# Patient Record
Sex: MALE | Race: WHITE | NOT HISPANIC OR LATINO | Employment: OTHER | URBAN - METROPOLITAN AREA
[De-identification: names, ages, dates, MRNs, and addresses within clinical notes are randomized per-mention and may not be internally consistent; named-entity substitution may affect disease eponyms.]

---

## 2019-04-19 ENCOUNTER — TELEPHONE (OUTPATIENT)
Dept: FAMILY MEDICINE | Facility: CLINIC | Age: 59
End: 2019-04-19

## 2019-08-13 PROBLEM — D64.9 ANEMIA: Status: ACTIVE | Noted: 2017-11-13

## 2019-08-13 PROBLEM — C18.9 MALIGNANT NEOPLASM OF LARGE INTESTINE (CMS/HCC): Status: ACTIVE | Noted: 2017-11-13

## 2019-08-14 ENCOUNTER — OFFICE VISIT (OUTPATIENT)
Dept: FAMILY MEDICINE | Facility: CLINIC | Age: 59
End: 2019-08-14
Payer: COMMERCIAL

## 2019-08-14 VITALS
DIASTOLIC BLOOD PRESSURE: 84 MMHG | TEMPERATURE: 98.2 F | SYSTOLIC BLOOD PRESSURE: 144 MMHG | BODY MASS INDEX: 26.01 KG/M2 | HEIGHT: 68 IN | WEIGHT: 171.6 LBS | HEART RATE: 72 BPM | OXYGEN SATURATION: 98 %

## 2019-08-14 DIAGNOSIS — C18.9 MALIGNANT NEOPLASM OF COLON, UNSPECIFIED PART OF COLON (CMS/HCC): ICD-10-CM

## 2019-08-14 DIAGNOSIS — Z00.00 ENCOUNTER FOR GENERAL ADULT MEDICAL EXAMINATION WITHOUT ABNORMAL FINDINGS: Primary | ICD-10-CM

## 2019-08-14 DIAGNOSIS — Z12.5 SCREENING FOR PROSTATE CANCER: ICD-10-CM

## 2019-08-14 PROCEDURE — 90715 TDAP VACCINE 7 YRS/> IM: CPT | Performed by: FAMILY MEDICINE

## 2019-08-14 PROCEDURE — 90471 IMMUNIZATION ADMIN: CPT | Performed by: FAMILY MEDICINE

## 2019-08-14 PROCEDURE — 99396 PREV VISIT EST AGE 40-64: CPT | Mod: 25 | Performed by: FAMILY MEDICINE

## 2019-08-14 PROCEDURE — 36415 COLL VENOUS BLD VENIPUNCTURE: CPT | Performed by: FAMILY MEDICINE

## 2019-08-14 ASSESSMENT — ENCOUNTER SYMPTOMS
ADENOPATHY: 0
NAUSEA: 0
VOMITING: 0
SORE THROAT: 0
WEAKNESS: 0
DYSURIA: 0
DIZZINESS: 0
COUGH: 0
CHILLS: 0
ARTHRALGIAS: 0
BACK PAIN: 0
ABDOMINAL PAIN: 0
CONSTIPATION: 0
NUMBNESS: 0
BLOOD IN STOOL: 0
RHINORRHEA: 0
FREQUENCY: 0
PALPITATIONS: 0
SHORTNESS OF BREATH: 0
DIARRHEA: 0
FEVER: 0

## 2019-08-14 NOTE — PROGRESS NOTES
[address] [address]  [phone]       Reason for visit:   Chief Complaint   Patient presents with   • Annual Exam      HPI   Dane Beckwith is a 59 y.o. male who presents with CPX   Updates Yes Checked BP at home this AM - 120/80    Diet - Healthy  Exercise - Daily - Cardio, light weights    Smoke No   Alcohol Yes   Drugs No     Lives with Wife  Work Retired CPA  Colonoscopy Due Yes   CT Lung Due No     Hep A Due Yes   TDAP Due Yes   Flu Due No   Shingles Due Yes   Lipids Due Yes   Hep C Due No    Past Medical History:   Diagnosis Date   • Colon cancer (CMS/HCC) (HCC)      Past Surgical History:   Procedure Laterality Date   • COLECTOMY       Social History     Social History   • Marital status:      Spouse name: N/A   • Number of children: N/A   • Years of education: N/A     Occupational History   • Not on file.     Social History Main Topics   • Smoking status: Never Smoker   • Smokeless tobacco: Never Used   • Alcohol use Yes      Comment: 7-8/week   • Drug use: No   • Sexual activity: Yes     Partners: Female     Other Topics Concern   • Not on file     Social History Narrative    Do you wear your seatbelt? Yes    Do you have smoke detector in your home? Yes    Do you have a carbon monoxide detector in your home? 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          Family History   Problem Relation Age of Onset   • Breast cancer Mother    • Colon cancer Father    • Heart disease Father    • Heart disease Father's Brother      Amoxicillin trihydrate; Penicillins; and Potassium clavulanate  No current outpatient prescriptions on file.     No current facility-administered medications for this visit.        Review of Systems   Constitutional: Negative for chills and fever.   HENT: Negative for congestion, rhinorrhea and sore throat.    Respiratory: Negative for cough and shortness of breath.   "  Cardiovascular: Negative for chest pain and palpitations.   Gastrointestinal: Negative for abdominal pain, blood in stool, constipation, diarrhea, nausea and vomiting.   Genitourinary: Negative for dysuria, frequency and testicular pain.   Musculoskeletal: Negative for arthralgias and back pain.   Skin: Negative for rash.   Allergic/Immunologic: Negative for environmental allergies and food allergies.   Neurological: Negative for dizziness, weakness and numbness.   Hematological: Negative for adenopathy.     Objective   Vitals:    08/14/19 0958 08/14/19 1023   BP: (!) 162/84 (!) 144/84   BP Location: Right upper arm Right upper arm   Patient Position: Sitting Sitting   Pulse: 72    Temp: 36.8 °C (98.2 °F)    TempSrc: Oral    SpO2: 98%    Weight: 77.8 kg (171 lb 9.6 oz)    Height: 1.727 m (5' 8\")        Physical Exam   Constitutional: He is oriented to person, place, and time. He appears well-developed and well-nourished.   HENT:   Head: Normocephalic and atraumatic.   Right Ear: Tympanic membrane and ear canal normal.   Left Ear: Tympanic membrane and ear canal normal.   Nose: Nose normal.   Mouth/Throat: Oropharynx is clear and moist and mucous membranes are normal.   Eyes: Pupils are equal, round, and reactive to light. Conjunctivae are normal.   Neck: No thyroid mass and no thyromegaly present.   Cardiovascular: Normal rate, regular rhythm, S1 normal, S2 normal and normal pulses.  Exam reveals no gallop and no friction rub.    No murmur heard.  Pulses:       Radial pulses are 2+ on the right side, and 2+ on the left side.   Pulmonary/Chest: Effort normal and breath sounds normal. He has no wheezes. He has no rhonchi. He has no rales.   Abdominal: Soft. Normal appearance and bowel sounds are normal. There is no tenderness. There is no rebound and no guarding.   Musculoskeletal: Normal range of motion.   Lymphadenopathy:     He has no cervical adenopathy.   Neurological: He is alert and oriented to person, place, " and time. He has normal strength. No cranial nerve deficit.   Psychiatric: He has a normal mood and affect. His speech is normal and behavior is normal. Judgment normal. Cognition and memory are normal.   Nursing note and vitals reviewed.      Procedures    Health Maintenance   Topic Date Due   • Pneumococcal (1 of 3 - PCV13) 01/04/1964   • HIV Screening  01/04/1971   • DTaP, Tdap, and Td Vaccines (1 - Tdap) 01/04/1977   • Zoster Vaccine (1 of 2) 01/04/2008   • Colonoscopy  01/04/2008   • Influenza Vaccine (1) 08/01/2019   • Meningococcal ACWY  Aged Out   • Varicella Vaccines  Aged Out   • HIB Vaccines  Aged Out   • IPV Vaccines  Aged Out   • HPV Vaccines  Aged Out   • Hepatitis C Screening  Completed       Lab Results   Component Value Date    WBC 4.0 11/14/2017    HGB 15.2 11/14/2017    HCT 45.6 11/14/2017     11/14/2017    CHOL 172 11/14/2017    TRIG 80 11/14/2017    HDL 57 11/14/2017    ALT 12 11/14/2017    AST 14 11/14/2017     11/14/2017    K 4.3 11/14/2017     11/14/2017    CREATININE 0.94 11/14/2017    BUN 16 11/14/2017    CO2 23 11/14/2017    PSA 0.4 11/14/2017         Assessment   Problem List Items Addressed This Visit     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

## 2019-08-14 NOTE — PATIENT INSTRUCTIONS
Diet - Try to limit portion sizes, take out, fast food, processed foods. Alcohol can be a expensive source of calories! If these are current problems for you, pick one area and focus on that first!    Exercise - Goal should be 30-40 minutes, 3-4 times a week. If you are currently not exercising, set reachable goals with short term deadlines!    Preventative Care Due - Colonoscopy     Labs Due Today - Yes    Shots Due Today - TDap    Follow up - 1 year or sooner if anything comes up    Make sure you are seeing your specialists, Eye Doctor, Foot Doctor, OBGYN yearly!    Check BP at home 1-2x/week

## 2019-08-15 LAB
ALBUMIN SERPL-MCNC: 4.3 G/DL (ref 3.6–4.8)
ALBUMIN/GLOB SERPL: 2 {RATIO} (ref 1.2–2.2)
ALP SERPL-CCNC: 69 IU/L (ref 39–117)
ALT SERPL-CCNC: 15 IU/L (ref 0–44)
AST SERPL-CCNC: 17 IU/L (ref 0–40)
BASOPHILS # BLD AUTO: 0 X10E3/UL (ref 0–0.2)
BASOPHILS NFR BLD AUTO: 1 %
BILIRUB SERPL-MCNC: 0.5 MG/DL (ref 0–1.2)
BUN SERPL-MCNC: 18 MG/DL (ref 8–27)
BUN/CREAT SERPL: 19 (ref 10–24)
CALCIUM SERPL-MCNC: 9.7 MG/DL (ref 8.6–10.2)
CHLORIDE SERPL-SCNC: 103 MMOL/L (ref 96–106)
CHOLEST SERPL-MCNC: 200 MG/DL (ref 100–199)
CO2 SERPL-SCNC: 24 MMOL/L (ref 20–29)
CREAT SERPL-MCNC: 0.97 MG/DL (ref 0.76–1.27)
EOSINOPHIL # BLD AUTO: 0.1 X10E3/UL (ref 0–0.4)
EOSINOPHIL NFR BLD AUTO: 2 %
ERYTHROCYTE [DISTWIDTH] IN BLOOD BY AUTOMATED COUNT: 14.4 % (ref 12.3–15.4)
GLOBULIN SER CALC-MCNC: 2.1 G/DL (ref 1.5–4.5)
GLUCOSE SERPL-MCNC: 92 MG/DL (ref 65–99)
HCT VFR BLD AUTO: 45.3 % (ref 37.5–51)
HDLC SERPL-MCNC: 59 MG/DL
HGB BLD-MCNC: 15.3 G/DL (ref 13–17.7)
IMM GRANULOCYTES # BLD AUTO: 0 X10E3/UL (ref 0–0.1)
IMM GRANULOCYTES NFR BLD AUTO: 0 %
LAB CORP EGFR IF AFRICN AM: 97 ML/MIN/1.73
LAB CORP EGFR IF NONAFRICN AM: 84 ML/MIN/1.73
LDLC SERPL CALC-MCNC: 121 MG/DL (ref 0–99)
LYMPHOCYTES # BLD AUTO: 1.2 X10E3/UL (ref 0.7–3.1)
LYMPHOCYTES NFR BLD AUTO: 27 %
MCH RBC QN AUTO: 28.1 PG (ref 26.6–33)
MCHC RBC AUTO-ENTMCNC: 33.8 G/DL (ref 31.5–35.7)
MCV RBC AUTO: 83 FL (ref 79–97)
MONOCYTES # BLD AUTO: 0.5 X10E3/UL (ref 0.1–0.9)
MONOCYTES NFR BLD AUTO: 12 %
NEUTROPHILS # BLD AUTO: 2.6 X10E3/UL (ref 1.4–7)
NEUTROPHILS NFR BLD AUTO: 58 %
PLATELET # BLD AUTO: 207 X10E3/UL (ref 150–450)
POTASSIUM SERPL-SCNC: 4.6 MMOL/L (ref 3.5–5.2)
PROT SERPL-MCNC: 6.4 G/DL (ref 6–8.5)
PSA SERPL-MCNC: 0.5 NG/ML (ref 0–4)
RBC # BLD AUTO: 5.44 X10E6/UL (ref 4.14–5.8)
SODIUM SERPL-SCNC: 141 MMOL/L (ref 134–144)
TRIGL SERPL-MCNC: 99 MG/DL (ref 0–149)
VLDLC SERPL CALC-MCNC: 20 MG/DL (ref 5–40)
WBC # BLD AUTO: 4.4 X10E3/UL (ref 3.4–10.8)

## 2020-10-01 ENCOUNTER — OFFICE VISIT (OUTPATIENT)
Dept: FAMILY MEDICINE | Facility: CLINIC | Age: 60
End: 2020-10-01
Payer: COMMERCIAL

## 2020-10-01 VITALS
HEIGHT: 68 IN | BODY MASS INDEX: 25.55 KG/M2 | OXYGEN SATURATION: 98 % | WEIGHT: 168.6 LBS | SYSTOLIC BLOOD PRESSURE: 118 MMHG | HEART RATE: 73 BPM | DIASTOLIC BLOOD PRESSURE: 82 MMHG | TEMPERATURE: 97.3 F

## 2020-10-01 DIAGNOSIS — C18.9 MALIGNANT NEOPLASM OF COLON, UNSPECIFIED PART OF COLON (CMS/HCC): ICD-10-CM

## 2020-10-01 DIAGNOSIS — Z12.5 SCREENING FOR PROSTATE CANCER: ICD-10-CM

## 2020-10-01 DIAGNOSIS — Z00.00 ENCOUNTER FOR GENERAL ADULT MEDICAL EXAMINATION WITHOUT ABNORMAL FINDINGS: Primary | ICD-10-CM

## 2020-10-01 PROCEDURE — 36415 COLL VENOUS BLD VENIPUNCTURE: CPT | Performed by: FAMILY MEDICINE

## 2020-10-01 PROCEDURE — 99396 PREV VISIT EST AGE 40-64: CPT | Mod: 25 | Performed by: FAMILY MEDICINE

## 2020-10-01 ASSESSMENT — ENCOUNTER SYMPTOMS
SORE THROAT: 0
BACK PAIN: 0
RHINORRHEA: 0
COUGH: 0
ABDOMINAL PAIN: 0
ADENOPATHY: 0
NAUSEA: 0
PALPITATIONS: 0
ARTHRALGIAS: 0
FREQUENCY: 0
DYSURIA: 0
CONSTIPATION: 0
FEVER: 0
DIFFICULTY URINATING: 0
DIZZINESS: 0
CHILLS: 0
WEAKNESS: 0
BLOOD IN STOOL: 0
NUMBNESS: 0
SHORTNESS OF BREATH: 0
VOMITING: 0
DIARRHEA: 0

## 2020-10-01 NOTE — PATIENT INSTRUCTIONS
Diet - Try to limit portion sizes, take out, fast food, processed foods. Alcohol can be a expensive source of calories! If these are current problems for you, pick one area and focus on that first! There is no such thing as a perfect diet. If you are trying to lose weight, it will be difficult to do with foods you do not enjoy.  You may need to try a few different things before finding something.  In general, the diet with the best evidence is the Mediterranean diet. If you have high blood pressure, the DASH diet has good evidence to lower weight and BP.    Exercise - Goal should be 30-40 minutes, 3-4 times a week. If you are currently not exercising, set reachable goals with short term deadlines! The same goes with diet - you are less likely to be successful if you are doing something you don't enjoy.  Weights - even 1-2 pounds! - are great to strengthen bones in old adults.      Preventative Care Due - Colonoscopy in December    Labs Due Today - Yes    Shots Due Today - None    Follow up - 1 year or sooner if anything comes up. We keep same-day sick appointments available every day for last minute problems.  If it is during the week - call us! Often times we can prevent an ER or UC visit! For certain problems, a telemedicine visit can be a great way to see me without having to come into the office or go to an UC!    If you have any specialists such as a Cardiologist, Gastroenterologist for a chronic problem, make sure you are following up with them as recommended!  Diabetics should be having a yearly eye exam by a Optometrist/Ophthalmologist and a foot exam by a Podiatrist!  Women should see their GYN yearly - though you may not need a pap smear done at each visit.  Those with a family history of skin cancer should see a Dermatologist annually.    [facility]

## 2020-10-01 NOTE — ASSESSMENT & PLAN NOTE
Adult Male  Complaints - None  Diet - Healthy  Activity - Active with walking  Tobacco Use - None  EtOH Use - 1/night  Drug Use - None  Sexual Activity -   PMH - Colon Cancer  FHX - M Breast; D colon CAD  Labs Due - CBC CMP LP PSA  Immunizations Due - None  Preventative Care Due - Colonoscopy in Winter  Refused - None  Follow up - 1 y

## 2020-10-01 NOTE — PROGRESS NOTES
[place] [address]  [place] [address]  [phone]       Reason for visit:   Chief Complaint   Patient presents with   • Annual Exam      HPI   Dane Beckwith is a 60 y.o. male who presents for his CPX   Updates No Checks BP at home - well controlled 120-130s/70s    Diet - Healthy  Exercise - Walking    Smoke No   Alcohol Yes   Drugs No     Lives with Wife  Work Retired - CPA  Colonoscopy Due No Due 12/20  CT Lung Due No     Hep A Due Yes   TDAP Due No   Flu Due No   Shingles Due No   Lipids Due Yes   Hep C Due No    Past Medical History:   Diagnosis Date   • Colon cancer (CMS/HCC)      Past Surgical History:   Procedure Laterality Date   • COLECTOMY       Social History     Socioeconomic History   • Marital status:      Spouse name: Not on file   • Number of children: Not on file   • Years of education: Not on file   • Highest education level: Not on file   Occupational History   • Not on file   Social Needs   • Financial resource strain: Not on file   • Food insecurity     Worry: Not on file     Inability: Not on file   • Transportation needs     Medical: Not on file     Non-medical: Not on file   Tobacco Use   • Smoking status: Never Smoker   • Smokeless tobacco: Never Used   Substance and Sexual Activity   • Alcohol use: Yes     Comment: 7-8/week   • Drug use: No   • Sexual activity: Yes     Partners: Female   Lifestyle   • Physical activity     Days per week: Not on file     Minutes per session: Not on file   • Stress: Not on file   Relationships   • Social connections     Talks on phone: Not on file     Gets together: Not on file     Attends Orthodoxy service: Not on file     Active member of club or organization: Not on file     Attends meetings of clubs or organizations: Not on file     Relationship status: Not on file   • Intimate partner violence     Fear of current or ex partner: Not on file     Emotionally abused: Not on file     Physically  "abused: Not on file     Forced sexual activity: Not on file   Other Topics Concern   • Not on file   Social History Narrative    Do you wear your seatbelt? Yes    Do you have smoke detector in your home? Yes    Do you have a carbon monoxide detector in your home? 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      Family History   Problem Relation Age of Onset   • Breast cancer Biological Mother    • Colon cancer Biological Father    • Heart disease Biological Father    • Heart disease Father's Brother      Amoxicillin trihydrate, Penicillins, and Potassium clavulanate  No current outpatient medications on file.     No current facility-administered medications for this visit.        Review of Systems   Constitutional: Negative for chills and fever.   HENT: Negative for congestion, hearing loss, rhinorrhea and sore throat.    Eyes: Negative for visual disturbance.   Respiratory: Negative for cough and shortness of breath.    Cardiovascular: Negative for chest pain and palpitations.   Gastrointestinal: Negative for abdominal pain, blood in stool, constipation, diarrhea, nausea and vomiting.   Genitourinary: Negative for decreased urine volume, difficulty urinating, dysuria, frequency, testicular pain and urgency.        Nocturia - 0 to 1/night   Musculoskeletal: Negative for arthralgias and back pain.   Skin: Negative for rash.   Allergic/Immunologic: Negative for environmental allergies and food allergies.   Neurological: Negative for dizziness, weakness and numbness.   Hematological: Negative for adenopathy.     Objective   Vitals:    10/01/20 0914 10/01/20 0933   BP: (!) 153/90 118/82   BP Location: Left upper arm Left upper arm   Patient Position: Sitting Sitting   Pulse: 73    Temp: 36.3 °C (97.3 °F)    TempSrc: Temporal    SpO2: 98%    Weight: 76.5 kg (168 lb 9.6 oz)    Height: 1.727 m (5' 8\")        Physical Exam  Vitals signs and nursing note reviewed.   Constitutional:       " Appearance: Normal appearance. He is well-developed.   HENT:      Head: Normocephalic and atraumatic.      Right Ear: Tympanic membrane and ear canal normal.      Left Ear: Tympanic membrane and ear canal normal.      Nose: Nose normal.   Eyes:      Conjunctiva/sclera: Conjunctivae normal.      Pupils: Pupils are equal, round, and reactive to light.   Neck:      Thyroid: No thyroid mass or thyromegaly.   Cardiovascular:      Rate and Rhythm: Normal rate and regular rhythm.      Pulses: Normal pulses.           Radial pulses are 2+ on the right side and 2+ on the left side.      Heart sounds: S1 normal and S2 normal. No murmur. No friction rub. No gallop.    Pulmonary:      Effort: Pulmonary effort is normal.      Breath sounds: Normal breath sounds. No wheezing, rhonchi or rales.   Abdominal:      General: Bowel sounds are normal.      Palpations: Abdomen is soft.      Tenderness: There is no abdominal tenderness. There is no guarding or rebound.   Musculoskeletal: Normal range of motion.   Lymphadenopathy:      Cervical: No cervical adenopathy.   Neurological:      Mental Status: He is alert and oriented to person, place, and time.      Cranial Nerves: No cranial nerve deficit.   Psychiatric:         Speech: Speech normal.         Behavior: Behavior normal.         Judgment: Judgment normal.         Procedures    Health Maintenance   Topic Date Due   • Varicella Vaccines (1 of 2 - 2-dose childhood series) 01/04/1959   • Pneumococcal (1 of 3 - PCV13) 01/04/1964   • HIV Screening  01/04/1971   • Zoster Vaccine (1 of 2) 01/04/2008   • Influenza Vaccine (1) 08/01/2020   • Colonoscopy  12/18/2020   • DTaP, Tdap, and Td Vaccines (2 - Td) 08/14/2029   • Hepatitis C Screening  Completed   • Meningococcal ACWY  Aged Out   • HIB Vaccines  Aged Out   • IPV Vaccines  Aged Out   • HPV Vaccines  Aged Out       Lab Results   Component Value Date    WBC 4.4 08/14/2019    HGB 15.3 08/14/2019    HCT 45.3 08/14/2019      08/14/2019    CHOL 200 (H) 08/14/2019    TRIG 99 08/14/2019    HDL 59 08/14/2019    ALT 15 08/14/2019    AST 17 08/14/2019     08/14/2019    K 4.6 08/14/2019     08/14/2019    CREATININE 0.97 08/14/2019    BUN 18 08/14/2019    CO2 24 08/14/2019    PSA 0.5 08/14/2019         Assessment   Problem List Items Addressed This Visit        Digestive    Malignant neoplasm of large intestine (CMS/HCC)       Other    Encounter for general adult medical examination without abnormal findings - Primary     Adult Male  Complaints - None  Diet - Healthy  Activity - Active with walking  Tobacco Use - None  EtOH Use - 1/night  Drug Use - None  Sexual Activity -   PMH - Colon Cancer  FHX - M Breast; D colon CAD  Labs Due - CBC CMP LP PSA  Immunizations Due - None  Preventative Care Due - Colonoscopy in Winter  Refused - None  Follow up - 1 y         Relevant Orders    CBC and Differential    Comprehensive metabolic panel    Lipid panel      Other Visit Diagnoses     Screening for prostate cancer        Relevant Orders    PSA              Alexander Duron MD  10/1/2020

## 2020-10-02 LAB
ALBUMIN SERPL-MCNC: 4.4 G/DL (ref 3.8–4.8)
ALBUMIN/GLOB SERPL: 1.8 {RATIO} (ref 1.2–2.2)
ALP SERPL-CCNC: 72 IU/L (ref 39–117)
ALT SERPL-CCNC: 15 IU/L (ref 0–44)
AST SERPL-CCNC: 18 IU/L (ref 0–40)
BASOPHILS # BLD AUTO: 0 X10E3/UL (ref 0–0.2)
BASOPHILS NFR BLD AUTO: 1 %
BILIRUB SERPL-MCNC: 0.5 MG/DL (ref 0–1.2)
BUN SERPL-MCNC: 23 MG/DL (ref 8–27)
BUN/CREAT SERPL: 26 (ref 10–24)
CALCIUM SERPL-MCNC: 9.5 MG/DL (ref 8.6–10.2)
CHLORIDE SERPL-SCNC: 104 MMOL/L (ref 96–106)
CHOLEST SERPL-MCNC: 203 MG/DL (ref 100–199)
CO2 SERPL-SCNC: 25 MMOL/L (ref 20–29)
CREAT SERPL-MCNC: 0.9 MG/DL (ref 0.76–1.27)
EOSINOPHIL # BLD AUTO: 0.1 X10E3/UL (ref 0–0.4)
EOSINOPHIL NFR BLD AUTO: 2 %
ERYTHROCYTE [DISTWIDTH] IN BLOOD BY AUTOMATED COUNT: 13.2 % (ref 11.6–15.4)
GLOBULIN SER CALC-MCNC: 2.4 G/DL (ref 1.5–4.5)
GLUCOSE SERPL-MCNC: 99 MG/DL (ref 65–99)
HCT VFR BLD AUTO: 47.8 % (ref 37.5–51)
HDLC SERPL-MCNC: 61 MG/DL
HGB BLD-MCNC: 15.9 G/DL (ref 13–17.7)
IMM GRANULOCYTES # BLD AUTO: 0 X10E3/UL (ref 0–0.1)
IMM GRANULOCYTES NFR BLD AUTO: 1 %
LAB CORP EGFR IF AFRICN AM: 105 ML/MIN/1.73
LAB CORP EGFR IF NONAFRICN AM: 91 ML/MIN/1.73
LDLC SERPL CALC-MCNC: 127 MG/DL (ref 0–99)
LYMPHOCYTES # BLD AUTO: 1.1 X10E3/UL (ref 0.7–3.1)
LYMPHOCYTES NFR BLD AUTO: 25 %
MCH RBC QN AUTO: 28.1 PG (ref 26.6–33)
MCHC RBC AUTO-ENTMCNC: 33.3 G/DL (ref 31.5–35.7)
MCV RBC AUTO: 85 FL (ref 79–97)
MONOCYTES # BLD AUTO: 0.5 X10E3/UL (ref 0.1–0.9)
MONOCYTES NFR BLD AUTO: 11 %
NEUTROPHILS # BLD AUTO: 2.7 X10E3/UL (ref 1.4–7)
NEUTROPHILS NFR BLD AUTO: 60 %
PLATELET # BLD AUTO: 228 X10E3/UL (ref 150–450)
POTASSIUM SERPL-SCNC: 5.4 MMOL/L (ref 3.5–5.2)
PROT SERPL-MCNC: 6.8 G/DL (ref 6–8.5)
PSA SERPL-MCNC: 0.4 NG/ML (ref 0–4)
RBC # BLD AUTO: 5.66 X10E6/UL (ref 4.14–5.8)
SODIUM SERPL-SCNC: 140 MMOL/L (ref 134–144)
TRIGL SERPL-MCNC: 82 MG/DL (ref 0–149)
VLDLC SERPL CALC-MCNC: 15 MG/DL (ref 5–40)
WBC # BLD AUTO: 4.4 X10E3/UL (ref 3.4–10.8)

## 2021-03-01 ENCOUNTER — LAB REQUISITION (OUTPATIENT)
Dept: LAB | Facility: HOSPITAL | Age: 61
End: 2021-03-01
Payer: COMMERCIAL

## 2021-03-01 DIAGNOSIS — D03.39 MELANOMA IN SITU OF OTHER PARTS OF FACE (CMS/HCC): ICD-10-CM

## 2021-03-01 PROCEDURE — 88342 IMHCHEM/IMCYTCHM 1ST ANTB: CPT | Performed by: PLASTIC SURGERY

## 2021-03-04 LAB
CASE RPRT: NORMAL
CLINICAL INFO: NORMAL
IMMUNE STAIN STUDY: NORMAL
PATH REPORT.FINAL DX SPEC: NORMAL
PATH REPORT.FINAL DX SPEC: NORMAL
PATH REPORT.GROSS SPEC: NORMAL

## 2021-03-05 PROCEDURE — 88305 TISSUE EXAM BY PATHOLOGIST: CPT | Performed by: PLASTIC SURGERY

## 2021-03-05 PROCEDURE — 88304 TISSUE EXAM BY PATHOLOGIST: CPT | Performed by: PLASTIC SURGERY

## 2021-03-08 ENCOUNTER — LAB REQUISITION (OUTPATIENT)
Dept: LAB | Facility: HOSPITAL | Age: 61
End: 2021-03-08
Payer: COMMERCIAL

## 2021-03-08 DIAGNOSIS — D03.39 MELANOMA IN SITU OF OTHER PARTS OF FACE (CMS/HCC): ICD-10-CM

## 2021-03-10 LAB
CASE RPRT: NORMAL
CLINICAL INFO: NORMAL
PATH REPORT.FINAL DX SPEC: NORMAL
PATH REPORT.FINAL DX SPEC: NORMAL
PATH REPORT.GROSS SPEC: NORMAL

## 2021-04-14 DIAGNOSIS — Z23 ENCOUNTER FOR IMMUNIZATION: ICD-10-CM

## 2021-10-06 ENCOUNTER — OFFICE VISIT (OUTPATIENT)
Dept: FAMILY MEDICINE | Facility: CLINIC | Age: 61
End: 2021-10-06
Payer: COMMERCIAL

## 2021-10-06 VITALS
WEIGHT: 168 LBS | TEMPERATURE: 98.5 F | DIASTOLIC BLOOD PRESSURE: 80 MMHG | SYSTOLIC BLOOD PRESSURE: 124 MMHG | BODY MASS INDEX: 25.46 KG/M2 | HEIGHT: 68 IN | OXYGEN SATURATION: 98 % | HEART RATE: 55 BPM

## 2021-10-06 DIAGNOSIS — C43.31 MALIGNANT MELANOMA OF NOSE (CMS/HCC): ICD-10-CM

## 2021-10-06 DIAGNOSIS — C43.59 MELANOMA OF BACK (CMS/HCC): ICD-10-CM

## 2021-10-06 DIAGNOSIS — I49.3 PREMATURE VENTRICULAR CONTRACTION: ICD-10-CM

## 2021-10-06 DIAGNOSIS — Z12.5 SCREENING FOR PROSTATE CANCER: ICD-10-CM

## 2021-10-06 DIAGNOSIS — Z00.00 ENCOUNTER FOR GENERAL ADULT MEDICAL EXAMINATION WITHOUT ABNORMAL FINDINGS: Primary | ICD-10-CM

## 2021-10-06 DIAGNOSIS — C18.9 MALIGNANT NEOPLASM OF COLON, UNSPECIFIED PART OF COLON (CMS/HCC): ICD-10-CM

## 2021-10-06 PROCEDURE — 99396 PREV VISIT EST AGE 40-64: CPT | Mod: 25 | Performed by: FAMILY MEDICINE

## 2021-10-06 PROCEDURE — 36415 COLL VENOUS BLD VENIPUNCTURE: CPT | Performed by: FAMILY MEDICINE

## 2021-10-06 PROCEDURE — 3008F BODY MASS INDEX DOCD: CPT | Performed by: FAMILY MEDICINE

## 2021-10-06 ASSESSMENT — ENCOUNTER SYMPTOMS
ADENOPATHY: 0
ARTHRALGIAS: 0
SHORTNESS OF BREATH: 0
FREQUENCY: 0
BLOOD IN STOOL: 0
ABDOMINAL PAIN: 0
BACK PAIN: 0
SORE THROAT: 0
WEAKNESS: 0
DYSURIA: 0
NAUSEA: 0
NUMBNESS: 0
RHINORRHEA: 0
PALPITATIONS: 0
COUGH: 0
CONSTIPATION: 0
DIZZINESS: 0
DIARRHEA: 0
FEVER: 0
CHILLS: 0
VOMITING: 0

## 2021-10-06 ASSESSMENT — PATIENT HEALTH QUESTIONNAIRE - PHQ9: SUM OF ALL RESPONSES TO PHQ9 QUESTIONS 1 & 2: 0

## 2021-10-06 NOTE — PROGRESS NOTES
[place] [address]  [phone]       Reason for visit:   Chief Complaint   Patient presents with   • Annual Exam      HPI   Dane Beckwith is a 61 y.o. male who presents for his CPX   Medical Updates Yes Had PAT for melanoma on nose. Had PVCs during his surgery.    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   • Melanoma (CMS/HCC)    • Melanoma of back (CMS/HCC) 10/6/2021   • Premature ventricular contraction 10/6/2021     Past Surgical History:   Procedure Laterality Date   • COLECTOMY     • SKIN BIOPSY      nose for melanoma     Social History     Socioeconomic History   • Marital status:      Spouse name: Not on file   • Number of children: Not on file   • Years of education: Not on file   • Highest education level: Not on file   Occupational History   • Not on file   Tobacco Use   • Smoking status: Never Smoker   • Smokeless tobacco: Never Used   Substance and Sexual Activity   • Alcohol use: Yes     Comment: 6-7/wk   • Drug use: No   • Sexual activity: Yes     Partners: Female   Other Topics Concern   • Not on file   Social History Narrative    Do you wear your seatbelt? Yes    Do you have smoke detector in your home? Yes    Do you have a carbon monoxide detector in your home? 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      Social Determinants of Health     Financial Resource Strain:    • Difficulty of  Paying Living Expenses: Not on file   Food Insecurity:    • Worried About Running Out of Food in the Last Year: Not on file   • Ran Out of Food in the Last Year: Not on file   Transportation Needs:    • Lack of Transportation (Medical): Not on file   • Lack of Transportation (Non-Medical): Not on file   Physical Activity:    • Days of Exercise per Week: Not on file   • Minutes of Exercise per Session: Not on file   Stress:    • Feeling of Stress : Not on file   Social Connections:    • Frequency of Communication with Friends and Family: Not on file   • Frequency of Social Gatherings with Friends and Family: Not on file   • Attends Jehovah's witness Services: Not on file   • Active Member of Clubs or Organizations: Not on file   • Attends Club or Organization Meetings: Not on file   • Marital Status: Not on file   Intimate Partner Violence:    • Fear of Current or Ex-Partner: Not on file   • Emotionally Abused: Not on file   • Physically Abused: Not on file   • Sexually Abused: Not on file   Housing Stability:    • Unable to Pay for Housing in the Last Year: Not on file   • Number of Places Lived in the Last Year: Not on file   • Unstable Housing in the Last Year: Not on file     Family History   Problem Relation Age of Onset   • Breast cancer Biological Mother    • Colon cancer Biological Father    • Heart disease Biological Father    • Heart disease Father's Brother      Amoxicillin trihydrate, Penicillins, and Potassium clavulanate  Current Outpatient Medications   Medication Sig Dispense Refill   • ascorbic acid (VITAMIN C ORAL) Take by mouth.     • cholecalciferol, vitamin D3, (VITAMIN D3 ORAL) Take by mouth.       No current facility-administered medications for this visit.       Review of Systems   Constitutional: Negative for chills and fever.   HENT: Negative for congestion, hearing loss, rhinorrhea and sore throat.    Eyes: Negative for visual disturbance.   Respiratory: Negative for cough and shortness of breath.   "  Cardiovascular: Negative for chest pain and palpitations.   Gastrointestinal: Negative for abdominal pain, blood in stool, constipation, diarrhea, nausea and vomiting.   Genitourinary: Negative for dysuria, frequency and testicular pain.        Nocturia - 0-1/night   Musculoskeletal: Negative for arthralgias and back pain.   Skin: Negative for rash.   Allergic/Immunologic: Negative for environmental allergies and food allergies.   Neurological: Negative for dizziness, weakness and numbness.   Hematological: Negative for adenopathy.     Objective   Vitals:    10/06/21 0823 10/06/21 0847   BP: (!) 138/93 124/80   BP Location: Left upper arm Right upper arm   Patient Position: Sitting Sitting   Pulse: (!) 55    Temp: 36.9 °C (98.5 °F)    TempSrc: Oral    SpO2: 98%    Weight: 76.2 kg (168 lb)    Height: 1.727 m (5' 8\")        Physical Exam  Vitals and nursing note reviewed.   Constitutional:       Appearance: Normal appearance. He is well-developed.   HENT:      Head: Normocephalic and atraumatic.      Right Ear: Tympanic membrane and ear canal normal.      Left Ear: Tympanic membrane and ear canal normal.      Nose: Nose normal.   Eyes:      Conjunctiva/sclera: Conjunctivae normal.      Pupils: Pupils are equal, round, and reactive to light.   Neck:      Thyroid: No thyroid mass or thyromegaly.   Cardiovascular:      Rate and Rhythm: Normal rate and regular rhythm.      Pulses: Normal pulses.           Radial pulses are 2+ on the right side and 2+ on the left side.      Heart sounds: S1 normal and S2 normal. No murmur heard.  No friction rub. No gallop.    Pulmonary:      Effort: Pulmonary effort is normal.      Breath sounds: Normal breath sounds. No wheezing, rhonchi or rales.   Abdominal:      General: Bowel sounds are normal.      Palpations: Abdomen is soft.      Tenderness: There is no abdominal tenderness. There is no guarding or rebound.   Musculoskeletal:         General: Normal range of motion. "   Lymphadenopathy:      Cervical: No cervical adenopathy.   Neurological:      Mental Status: He is alert and oriented to person, place, and time.      Cranial Nerves: No cranial nerve deficit.   Psychiatric:         Speech: Speech normal.         Behavior: Behavior normal.         Judgment: Judgment normal.         Procedures    Health Maintenance   Topic Date Due   • Pneumococcal (1 of 4 - PCV13) Never done   • COVID-19 Vaccine (1) Never done   • HIV Screening  Never done   • Colonoscopy  02/22/2022   • DTaP, Tdap, and Td Vaccines (2 - Td or Tdap) 08/14/2029   • Zoster Vaccine  Completed   • Influenza Vaccine  Completed   • Hepatitis C Screening  Completed   • Meningococcal ACWY  Aged Out   • HIB Vaccines  Aged Out   • IPV Vaccines  Aged Out   • HPV Vaccines  Aged Out       Lab Results   Component Value Date    WBC 4.4 10/01/2020    HGB 15.9 10/01/2020    HCT 47.8 10/01/2020     10/01/2020    CHOL 203 (H) 10/01/2020    TRIG 82 10/01/2020    HDL 61 10/01/2020    ALT 15 10/01/2020    AST 18 10/01/2020     10/01/2020    K 5.4 (H) 10/01/2020     10/01/2020    CREATININE 0.90 10/01/2020    BUN 23 10/01/2020    CO2 25 10/01/2020    PSA 0.4 10/01/2020         Assessment   Problem List Items Addressed This Visit        Circulatory    Premature ventricular contraction    Relevant Orders    Ambulatory referral to Cardiology       Digestive    Malignant neoplasm of large intestine (CMS/HCC)       Other    Encounter for general adult medical examination without abnormal findings - Primary     Adult Male  Complaints - Had PVCs during melanoma surgery.  Had Melanoma of nose and lower back.  Diet - Healthy  Activity - Active with walking  Tobacco Use - None  EtOH Use - 6-7/week  Drug Use - None  Sexual Activity -   PMH - Colon Cancer, Melanoma of Nose and Back  FHX - M Breast; D colon CAD  Labs Due - CBC CMP LP PSA  Immunizations Due - None  Preventative Care Due - Colonoscopy in Winter  Refused -  None  Follow up - 1 y             Relevant Orders    CBC and Differential    Comprehensive metabolic panel    Lipid panel    Malignant melanoma of nose (CMS/HCC)    Melanoma of back (CMS/HCC)      Other Visit Diagnoses     Screening for prostate cancer        Relevant Orders    PSA              Alexander Duron MD  10/6/2021

## 2021-10-07 ENCOUNTER — TELEPHONE (OUTPATIENT)
Dept: SCHEDULING | Facility: CLINIC | Age: 61
End: 2021-10-07

## 2021-10-07 LAB
ALBUMIN SERPL-MCNC: 4.4 G/DL (ref 3.8–4.8)
ALBUMIN/GLOB SERPL: 1.8 {RATIO} (ref 1.2–2.2)
ALP SERPL-CCNC: 85 IU/L (ref 44–121)
ALT SERPL-CCNC: 12 IU/L (ref 0–44)
AST SERPL-CCNC: 15 IU/L (ref 0–40)
BASOPHILS # BLD AUTO: 0 X10E3/UL (ref 0–0.2)
BASOPHILS NFR BLD AUTO: 1 %
BILIRUB SERPL-MCNC: 0.6 MG/DL (ref 0–1.2)
BUN SERPL-MCNC: 18 MG/DL (ref 8–27)
BUN/CREAT SERPL: 20 (ref 10–24)
CALCIUM SERPL-MCNC: 9.7 MG/DL (ref 8.6–10.2)
CHLORIDE SERPL-SCNC: 104 MMOL/L (ref 96–106)
CHOLEST SERPL-MCNC: 210 MG/DL (ref 100–199)
CO2 SERPL-SCNC: 26 MMOL/L (ref 20–29)
CREAT SERPL-MCNC: 0.9 MG/DL (ref 0.76–1.27)
EOSINOPHIL # BLD AUTO: 0.1 X10E3/UL (ref 0–0.4)
EOSINOPHIL NFR BLD AUTO: 2 %
ERYTHROCYTE [DISTWIDTH] IN BLOOD BY AUTOMATED COUNT: 13.2 % (ref 11.6–15.4)
GLOBULIN SER CALC-MCNC: 2.4 G/DL (ref 1.5–4.5)
GLUCOSE SERPL-MCNC: 96 MG/DL (ref 65–99)
HCT VFR BLD AUTO: 48.4 % (ref 37.5–51)
HDLC SERPL-MCNC: 59 MG/DL
HGB BLD-MCNC: 16 G/DL (ref 13–17.7)
IMM GRANULOCYTES # BLD AUTO: 0 X10E3/UL (ref 0–0.1)
IMM GRANULOCYTES NFR BLD AUTO: 0 %
LAB CORP EGFR IF AFRICN AM: 105 ML/MIN/1.73
LAB CORP EGFR IF NONAFRICN AM: 91 ML/MIN/1.73
LDLC SERPL CALC-MCNC: 135 MG/DL (ref 0–99)
LYMPHOCYTES # BLD AUTO: 0.9 X10E3/UL (ref 0.7–3.1)
LYMPHOCYTES NFR BLD AUTO: 22 %
MCH RBC QN AUTO: 27.7 PG (ref 26.6–33)
MCHC RBC AUTO-ENTMCNC: 33.1 G/DL (ref 31.5–35.7)
MCV RBC AUTO: 84 FL (ref 79–97)
MONOCYTES # BLD AUTO: 0.4 X10E3/UL (ref 0.1–0.9)
MONOCYTES NFR BLD AUTO: 11 %
NEUTROPHILS # BLD AUTO: 2.7 X10E3/UL (ref 1.4–7)
NEUTROPHILS NFR BLD AUTO: 64 %
PLATELET # BLD AUTO: 234 X10E3/UL (ref 150–450)
POTASSIUM SERPL-SCNC: 5.2 MMOL/L (ref 3.5–5.2)
PROT SERPL-MCNC: 6.8 G/DL (ref 6–8.5)
PSA SERPL-MCNC: 0.5 NG/ML (ref 0–4)
RBC # BLD AUTO: 5.78 X10E6/UL (ref 4.14–5.8)
SODIUM SERPL-SCNC: 141 MMOL/L (ref 134–144)
TRIGL SERPL-MCNC: 91 MG/DL (ref 0–149)
VLDLC SERPL CALC-MCNC: 16 MG/DL (ref 5–40)
WBC # BLD AUTO: 4.1 X10E3/UL (ref 3.4–10.8)

## 2021-10-07 NOTE — TELEPHONE ENCOUNTER
New Patient Appointment Request    Name of caller: Ruddy    Reason for Visit: cardiac eval    Insurance: IBC        Diagnosis: premature ventrical contractions during melanoma removal surgery    Recent Procedures: none    Referred by: Dr Duron    Previous Cardiologist name and phone number: none    Best contact number: [phone]    Additional notes: Mon/Wed Wednesday/FRI preferred   [facility] office is first choice.

## 2021-10-07 NOTE — TELEPHONE ENCOUNTER
New Patient Visit Questionnaire Scheduled 11/17/2021  Use the F2 key to move through the asterisks.  Reason for appointment? PVC's     Who referred you: Dr. Duron    Name of primary care physician: Dr. Duron    Has the patient ever been seen by a cardiologist before?  No                 If YES, please obtain name and location of previous cardiologist.  Do you give us permission to obtain Medical records from the Providers listed? Yes    Have you had any recent lab work performed? Yes        If yes, where was this performed?    Tobacco Use: Never  ETOH:3+ drinks per week    Have you ever had any cardiac testing (echo, stress test, carotid or aortic ultrasounds, cardiac MRI, etc.) No    Have you ever had a cardiac catheterization, angioplasty, stent or heart surgery? No    Have you had any recent hospitalizations? No    If the patient has had previous testing/hospitalization, please determine where and when this was performed.  If the patient has copies of these reports or discharge summaries, please instruct them bring records to the visit.     PATIENT INSTRUCTIONS   Please bring a list of all your medications with the name of the medication, the dosage and how frequently you take the medication.  If you do not have a list, please bring all of your medication bottles with you.

## 2021-11-12 PROBLEM — Z00.00 ENCOUNTER FOR GENERAL ADULT MEDICAL EXAMINATION WITHOUT ABNORMAL FINDINGS: Status: RESOLVED | Noted: 2020-10-01 | Resolved: 2021-11-12

## 2021-11-12 NOTE — PROGRESS NOTES
Cardiology Consult/New Patient    Alexander Duron MD          Dane Beckwith is a 61 y.o. male identifies as who presents with     He is here for evaluation of PVCs he had during surgery in October for resection of melanoma from his nose  Next talked about him having frequent PVCs and he thinks he had a cardiac work-up at that time  He denies chest pain shortness of breath palpitations or syncope  His father had bypass surgery in his late 60s  He has hyperlipidemia LDL cholesterol 130  He also has a remote history of colon cancer resected 18 years ago  Recent melanoma on his torso and nose 2021  He has hyperlipidemia and a family history of cardiac disease              Patient Active Problem List    Diagnosis Date Noted   • Mixed hyperlipidemia 11/17/2021   • Family history of coronary artery disease 11/17/2021   • Malignant melanoma of nose (CMS/HCC) 10/06/2021   • Melanoma of back (CMS/HCC) 10/06/2021   • Premature ventricular contraction 10/06/2021   • Anemia 11/13/2017   • Malignant neoplasm of large intestine (CMS/HCC) 11/13/2017       Medical History:   Past Medical History:   Diagnosis Date   • Colon cancer (CMS/HCC)    • Malignant melanoma of nose (CMS/HCC) 10/6/2021   • Melanoma (CMS/HCC)    • Melanoma of back (CMS/HCC) 10/6/2021   • Premature ventricular contraction 10/6/2021       Surgical History:   Past Surgical History:   Procedure Laterality Date   • COLECTOMY     • SKIN BIOPSY      nose for melanoma       Allergies: Amoxicillin trihydrate, Penicillins, and Potassium clavulanate    Current Outpatient Medications   Medication Sig Dispense Refill   • ascorbic acid (VITAMIN C ORAL) Take by mouth.     • cholecalciferol, vitamin D3, (VITAMIN D3 ORAL) Take by mouth.       No current facility-administered medications for this visit.       Social History:   Social History     Socioeconomic History   • Marital status:      Spouse name: None   • Number of children: None   • Years of education: None   •  Highest education level: None   Occupational History   • None   Tobacco Use   • Smoking status: Never Smoker   • Smokeless tobacco: Never Used   Vaping Use   • Vaping Use: Never used   Substance and Sexual Activity   • Alcohol use: Yes     Comment: 6-7/wk   • Drug use: No   • Sexual activity: Yes     Partners: Female   Other Topics Concern   • None   Social History Narrative    Do you wear your seatbelt? Yes    Do you have smoke detector in your home? Yes    Do you have a carbon monoxide detector in your home? 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      Social Determinants of Health     Financial Resource Strain:    • Difficulty of Paying Living Expenses: Not on file   Food Insecurity:    • Worried About Running Out of Food in the Last Year: Not on file   • Ran Out of Food in the Last Year: Not on file   Transportation Needs:    • Lack of Transportation (Medical): Not on file   • Lack of Transportation (Non-Medical): Not on file   Physical Activity:    • Days of Exercise per Week: Not on file   • Minutes of Exercise per Session: Not on file   Stress:    • Feeling of Stress : Not on file   Social Connections:    • Frequency of Communication with Friends and Family: Not on file   • Frequency of Social Gatherings with Friends and Family: Not on file   • Attends Temple Services: Not on file   • Active Member of Clubs or Organizations: Not on file   • Attends Club or Organization Meetings: Not on file   • Marital Status: Not on file   Intimate Partner Violence:    • Fear of Current or Ex-Partner: Not on file   • Emotionally Abused: Not on file   • Physically Abused: Not on file   • Sexually Abused: Not on file   Housing Stability:    • Unable to Pay for Housing in the Last Year: Not on file   • Number of Places Lived in the Last Year: Not on file   • Unstable Housing in the Last Year: Not on file       Family History:   Family History   Problem Relation Age of Onset   • Breast  cancer Biological Mother    • Colon cancer Biological Father    • Heart disease Biological Father    • Heart disease Father's Brother    • Hyperlipidemia Biological Brother    • Hypertension Biological Brother    • Hyperlipidemia Biological Brother    • Hypertension Biological Brother        Review of Systems   ROS    Objective       Vitals:    11/17/21 0848   BP: (!) 150/92   Pulse: 69   SpO2: 99%       Physical Exam  Constitutional:       General: He is not in acute distress.     Appearance: Normal appearance. He is well-developed. He is not ill-appearing, toxic-appearing or diaphoretic.      Interventions: He is not intubated.  HENT:      Head: Normocephalic. Not microcephalic. No raccoon eyes, abrasion or contusion.      Nose: Nose normal.   Eyes:      General: No scleral icterus.        Left eye: No discharge.      Conjunctiva/sclera:      Right eye: Right conjunctiva is not injected.      Left eye: Left conjunctiva is not injected. No exudate or hemorrhage.     Pupils: Pupils are equal.   Neck:      Vascular: Normal carotid pulses. No carotid bruit or hepatojugular reflux.   Cardiovascular:      Rate and Rhythm: Normal rate and regular rhythm.      Chest Wall: PMI is not displaced.      Pulses: Normal pulses and intact distal pulses.      Heart sounds: Normal heart sounds. No murmur heard.  No friction rub. No gallop.    Pulmonary:      Effort: Pulmonary effort is normal. No tachypnea, bradypnea or respiratory distress. He is not intubated.      Breath sounds: Normal breath sounds. No stridor. No wheezing or rales.   Chest:      Chest wall: No tenderness.   Abdominal:      General: Bowel sounds are normal. There is no distension.      Palpations: Abdomen is soft.      Tenderness: There is no abdominal tenderness.   Musculoskeletal:         General: No deformity. Normal range of motion.      Cervical back: Normal range of motion and neck supple.   Skin:     Coloration: Skin is not pale.      Findings: No  abrasion, bruising, ecchymosis, erythema or rash.   Neurological:      Mental Status: He is alert and oriented to person, place, and time.   Psychiatric:         Mood and Affect: Mood is not anxious or depressed. Affect is not labile, blunt, angry or inappropriate.         Speech: He is communicative. Speech is not slurred.         Behavior: Behavior is not agitated, aggressive, withdrawn or combative.          Labs   Lab Results   Component Value Date    WBC 4.1 10/06/2021    HGB 16.0 10/06/2021    HCT 48.4 10/06/2021     10/06/2021    CHOL 210 (H) 10/06/2021    TRIG 91 10/06/2021    HDL 59 10/06/2021    ALT 12 10/06/2021    AST 15 10/06/2021     10/06/2021    K 5.2 10/06/2021     10/06/2021    CREATININE 0.90 10/06/2021    BUN 18 10/06/2021    CO2 26 10/06/2021    PSA 0.5 10/06/2021   [id number]    Imaging      CAT    COR CIARRA SCORE 1600 12/21          ELECTROPHYSIOLOGY    HOLTER NOV2021    The patient was monitored for 24 hours.  2. The predominant rhythm was sinus rhythm.  3. The average heart rate was 82 bpm.  4. The minimum heart rate was 63 bpm.  5. The maximum heart rate was 127 bpm.  6. There was 1 SVE tachycardia which lasted 4 beats 1.1 secs with an average heart rate of 159 bpm at 2:29 pm on 11/17/2021.  7. There were 71936  15% vpcpremature ventricular beats. There were 934 ventricular bigeminy cycles. There were 10 ventricular couplets          ECG   Nov 2021      Assessment/Plan     Premature ventricular contraction  During recent surgery for melanoma found to have frequent PVCs  By electrocardiogram appeared to be outflow tract PVCs usually benign mention to me with: Surgery years ago that they mention to him that he had PVCs he thinks he had a cardiac work-up at that time he is asymptomatic suspect this is a benign situation will check burden of PVCs with 24-hour monitor stress testing to rule out exercise-induced arrhythmia if LV systolic function and if not excessive burden and  asymptomatic, no therapy    Mixed hyperlipidemia  LDL cholesterol 130 coronary calcium score to be obtained if evidence of arterial sclerosis will begin statin    Family history of coronary artery disease  Father had bypass late 60s    Malignant melanoma of nose (CMS/HCC)  And torso 2021    Malignant neoplasm of large intestine (CMS/HCC)  Colon cancer resected chemotherapy radiation age 48    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  Please excuse any typographical errors.      Jarrett Caicedo MD  11/17/2021

## 2021-11-17 ENCOUNTER — HOSPITAL ENCOUNTER (OUTPATIENT)
Dept: CARDIOLOGY | Facility: CLINIC | Age: 61
Discharge: HOME | End: 2021-11-17
Payer: COMMERCIAL

## 2021-11-17 ENCOUNTER — OFFICE VISIT (OUTPATIENT)
Dept: CARDIOLOGY | Facility: CLINIC | Age: 61
End: 2021-11-17
Payer: COMMERCIAL

## 2021-11-17 ENCOUNTER — TELEPHONE (OUTPATIENT)
Dept: CARDIOLOGY | Facility: CLINIC | Age: 61
End: 2021-11-17

## 2021-11-17 VITALS
HEART RATE: 69 BPM | WEIGHT: 167 LBS | HEIGHT: 68 IN | SYSTOLIC BLOOD PRESSURE: 150 MMHG | DIASTOLIC BLOOD PRESSURE: 92 MMHG | BODY MASS INDEX: 25.31 KG/M2 | OXYGEN SATURATION: 99 %

## 2021-11-17 DIAGNOSIS — Z82.49 FAMILY HISTORY OF CORONARY ARTERY DISEASE: ICD-10-CM

## 2021-11-17 DIAGNOSIS — I49.3 PREMATURE VENTRICULAR CONTRACTION: Primary | ICD-10-CM

## 2021-11-17 DIAGNOSIS — I49.3 PREMATURE VENTRICULAR CONTRACTION: ICD-10-CM

## 2021-11-17 PROBLEM — E78.2 MIXED HYPERLIPIDEMIA: Status: ACTIVE | Noted: 2021-11-17

## 2021-11-17 PROCEDURE — 93000 ELECTROCARDIOGRAM COMPLETE: CPT | Performed by: INTERNAL MEDICINE

## 2021-11-17 PROCEDURE — 99204 OFFICE O/P NEW MOD 45 MIN: CPT | Performed by: INTERNAL MEDICINE

## 2021-11-17 PROCEDURE — 3008F BODY MASS INDEX DOCD: CPT | Performed by: INTERNAL MEDICINE

## 2021-11-17 ASSESSMENT — PAIN SCALES - GENERAL: PAINLEVEL: 0-NO PAIN

## 2021-11-17 NOTE — ASSESSMENT & PLAN NOTE
During recent surgery for melanoma found to have frequent PVCs  By electrocardiogram appeared to be outflow tract PVCs usually benign mention to me with: Surgery years ago that they mention to him that he had PVCs he thinks he had a cardiac work-up at that time he is asymptomatic suspect this is a benign situation will check burden of PVCs with 24-hour monitor stress testing to rule out exercise-induced arrhythmia if LV systolic function and if not excessive burden and asymptomatic, no therapy

## 2021-11-17 NOTE — ASSESSMENT & PLAN NOTE
LDL cholesterol 130 coronary calcium score to be obtained if evidence of arterial sclerosis will begin statin

## 2021-11-19 PROCEDURE — 93224 XTRNL ECG REC UP TO 48 HRS: CPT | Performed by: INTERNAL MEDICINE

## 2021-12-07 ENCOUNTER — TELEPHONE (OUTPATIENT)
Dept: SCHEDULING | Facility: CLINIC | Age: 61
End: 2021-12-07
Payer: COMMERCIAL

## 2021-12-07 NOTE — TELEPHONE ENCOUNTER
Pre-cert Request    Name of patient: Dane Beckwith    Name of physician: Jarrett Caicedo MD    Type of test: Ct Heart Coronary calcium score    Insurance: [facility]    Location having test done: [facility]    NPI of location: [id number]    Scheduled/Expected date for test: 12/15/21

## 2021-12-10 NOTE — TELEPHONE ENCOUNTER
Calcium Score denied due to medical necessity    Peer to peer  [phone]  Ref# [id number]     Calcium Score is a Self Pay test in which the pt is responsible for $129.00. The admin fee usually is not covered by the insurance company

## 2021-12-10 NOTE — TELEPHONE ENCOUNTER
Told pt the cost of the test is $129 out of pocket, he will speak to billing @ [facility] to see if they will admit a claim to his insurance anyway

## 2021-12-10 NOTE — TELEPHONE ENCOUNTER
Dr. Caicedo do you want to do a peer to peer for this calcium score or should I simply tell him it will be out pocket pay due to insurance usually not covering it?

## 2021-12-15 ENCOUNTER — HOSPITAL ENCOUNTER (OUTPATIENT)
Dept: RADIOLOGY | Facility: HOSPITAL | Age: 61
Discharge: HOME | End: 2021-12-15
Attending: INTERNAL MEDICINE

## 2021-12-15 DIAGNOSIS — Z82.49 FAMILY HISTORY OF CORONARY ARTERY DISEASE: ICD-10-CM

## 2021-12-15 DIAGNOSIS — I49.3 PREMATURE VENTRICULAR CONTRACTION: ICD-10-CM

## 2021-12-15 PROCEDURE — 75571 CT HRT W/O DYE W/CA TEST: CPT | Mod: MG

## 2021-12-16 ENCOUNTER — TELEPHONE (OUTPATIENT)
Dept: CARDIOLOGY | Facility: CLINIC | Age: 61
End: 2021-12-16
Payer: COMMERCIAL

## 2021-12-16 RX ORDER — ROSUVASTATIN CALCIUM 20 MG/1
20 TABLET, COATED ORAL DAILY
Qty: 90 TABLET | Refills: 1 | Status: SHIPPED | OUTPATIENT
Start: 2021-12-16 | End: 2021-12-16 | Stop reason: SDUPTHER

## 2021-12-16 RX ORDER — ROSUVASTATIN CALCIUM 20 MG/1
20 TABLET, COATED ORAL DAILY
Qty: 90 TABLET | Refills: 3 | Status: SHIPPED | OUTPATIENT
Start: 2021-12-16 | End: 2022-03-09 | Stop reason: SDUPTHER

## 2021-12-16 NOTE — TELEPHONE ENCOUNTER
Medicine Refill Request    Last Office: Visit date not found   Last Consult Visit: Visit date not found  Last Telemedicine Visit: Visit date not found    Next Appointment: Visit date not found    Rosuvastatin 20mg please send to new pharmacy       Current Outpatient Medications:   •  ascorbic acid (VITAMIN C ORAL), Take by mouth., Disp: , Rfl:   •  cholecalciferol, vitamin D3, (VITAMIN D3 ORAL), Take by mouth., Disp: , Rfl:   •  rosuvastatin 20 mg tablet, Take 1 tablet (20 mg total) by mouth daily., Disp: 90 tablet, Rfl: 1      BP Readings from Last 3 Encounters:   11/17/21 (!) 150/92   10/06/21 124/80   10/01/20 118/82       Recent Lab results:  Lab Results   Component Value Date    CHOL 210 (H) 10/06/2021   ,   Lab Results   Component Value Date    HDL 59 10/06/2021   ,   Lab Results   Component Value Date    LDLCALC 135 (H) 10/06/2021   ,   Lab Results   Component Value Date    TRIG 91 10/06/2021        Lab Results   Component Value Date    GLUCOSE 96 10/06/2021   , No results found for: HGBA1C      Lab Results   Component Value Date    CREATININE 0.90 10/06/2021       No results found for: TSH

## 2021-12-22 NOTE — PROGRESS NOTES
Cardiology Consult/New Patient    Alexander Duron MD          Dane Beckwith is a 61 y.o. male identifies as who presents with   He returns for follow-up and stress echocardiogram  I met him because he had frequent VPCs found seen on monitor during surgery for melanoma of the nose  He otherwise is asymptomatic  Cardiac monitor showed large burden of PVCs 15% burden  Coronary calcium score dated diagnosis of extensive arterial sclerosis with a score over 1600 he has hyperlipidemia with an LDL cholesterol 130 since his last visit he was started rosuvastatin  Father had bypass surgery in his sixties  Stress echocardiogram today good exercise level no obvious ischemia  Somewhat equivocal findings due to frequent VPCs      t        Patient Active Problem List    Diagnosis Date Noted   • Coronary artery disease involving native coronary artery of native heart without angina pectoris 12/30/2021   • Mixed hyperlipidemia 11/17/2021   • Family history of coronary artery disease 11/17/2021   • Malignant melanoma of nose (CMS/HCC) 10/06/2021   • Melanoma of back (CMS/HCC) 10/06/2021   • Premature ventricular contraction 10/06/2021   • Anemia 11/13/2017   • Malignant neoplasm of large intestine (CMS/HCC) 11/13/2017       Medical History:   Past Medical History:   Diagnosis Date   • Colon cancer (CMS/HCC)    • Malignant melanoma of nose (CMS/HCC) 10/6/2021   • Melanoma (CMS/HCC)    • Melanoma of back (CMS/HCC) 10/6/2021   • Premature ventricular contraction 10/6/2021       Surgical History:   Past Surgical History:   Procedure Laterality Date   • COLECTOMY     • SKIN BIOPSY      nose for melanoma       Allergies: Amoxicillin trihydrate, Penicillins, and Potassium clavulanate    Current Outpatient Medications   Medication Sig Dispense Refill   • ascorbic acid (VITAMIN C ORAL) Take by mouth.     • aspirin 81 mg chewable tablet Take 81 mg by mouth daily.     • cholecalciferol, vitamin D3, (VITAMIN D3 ORAL) Take by mouth.     •  rosuvastatin 20 mg tablet Take 1 tablet (20 mg total) by mouth daily. 90 tablet 3     No current facility-administered medications for this visit.       Social History:   Social History     Socioeconomic History   • Marital status:      Spouse name: None   • Number of children: None   • Years of education: None   • Highest education level: None   Occupational History   • None   Tobacco Use   • Smoking status: Never Smoker   • Smokeless tobacco: Never Used   Vaping Use   • Vaping Use: Never used   Substance and Sexual Activity   • Alcohol use: Yes     Comment: 6-7/wk   • Drug use: No   • Sexual activity: Yes     Partners: Female   Other Topics Concern   • None   Social History Narrative    Do you wear your seatbelt? Yes    Do you have smoke detector in your home? Yes    Do you have a carbon monoxide detector in your home? 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      Social Determinants of Health     Financial Resource Strain: Not on file   Food Insecurity: Not on file   Transportation Needs: Not on file   Physical Activity: Not on file   Stress: Not on file   Social Connections: Not on file   Intimate Partner Violence: Not on file   Housing Stability: Not on file       Family History:   Family History   Problem Relation Age of Onset   • Breast cancer Biological Mother    • Colon cancer Biological Father    • Heart disease Biological Father    • Heart disease Father's Brother    • Hyperlipidemia Biological Brother    • Hypertension Biological Brother    • Hyperlipidemia Biological Brother    • Hypertension Biological Brother        Review of Systems   ROS    Objective       Vitals:    12/30/21 1048   BP: 122/68   Pulse: 81   Resp: 16   SpO2: 98%       Physical Exam  Constitutional:       General: He is not in acute distress.     Appearance: Normal appearance. He is well-developed. He is not ill-appearing, toxic-appearing or diaphoretic.      Interventions: He is not  intubated.  HENT:      Head: Normocephalic. Not microcephalic. No raccoon eyes, abrasion or contusion.      Nose: Nose normal.   Eyes:      General: No scleral icterus.        Left eye: No discharge.      Conjunctiva/sclera:      Right eye: Right conjunctiva is not injected.      Left eye: Left conjunctiva is not injected. No exudate or hemorrhage.     Pupils: Pupils are equal.   Neck:      Vascular: Normal carotid pulses. No carotid bruit or hepatojugular reflux.   Cardiovascular:      Rate and Rhythm: Normal rate and regular rhythm.      Chest Wall: PMI is not displaced.      Pulses: Normal pulses and intact distal pulses.      Heart sounds: Normal heart sounds. No murmur heard.    No friction rub. No gallop.   Pulmonary:      Effort: Pulmonary effort is normal. No tachypnea, bradypnea or respiratory distress. He is not intubated.      Breath sounds: Normal breath sounds. No stridor. No wheezing or rales.   Chest:      Chest wall: No tenderness.   Abdominal:      General: Bowel sounds are normal. There is no distension.      Palpations: Abdomen is soft.      Tenderness: There is no abdominal tenderness.   Musculoskeletal:         General: No deformity. Normal range of motion.      Cervical back: Normal range of motion and neck supple.   Skin:     Coloration: Skin is not pale.      Findings: No abrasion, bruising, ecchymosis, erythema or rash.   Neurological:      Mental Status: He is alert and oriented to person, place, and time.   Psychiatric:         Mood and Affect: Mood is not anxious or depressed. Affect is not labile, blunt, angry or inappropriate.         Speech: He is communicative. Speech is not slurred.         Behavior: Behavior is not agitated, aggressive, withdrawn or combative.          Labs   Lab Results   Component Value Date    WBC 4.1 10/06/2021    HGB 16.0 10/06/2021    HCT 48.4 10/06/2021     10/06/2021    CHOL 210 (H) 10/06/2021    TRIG 91 10/06/2021    HDL 59 10/06/2021    ALT 12  10/06/2021    AST 15 10/06/2021     10/06/2021    K 5.2 10/06/2021     10/06/2021    CREATININE 0.90 10/06/2021    BUN 18 10/06/2021    CO2 26 10/06/2021    PSA 0.5 10/06/2021   [id number]    Imaging    CATH    PTCA MARCH 2022        FINDINGS:  1. 80% mid-LAD stenosis.   2. 80% mid-ramus stenosis. SMALL  3. Nonobstructive disease throughout the remainder of the epicardial coronaries.      INTERVENTION:   Successful PCI of  LAD with a  [id number] Synergy BISI was deployed to 12 makenna. It was postdilated with a 3.0x12mm noncompliant balloon to 16 makenna. Repeat angiography demonstrated no residual stenosis and DARRIAN 3 flow.               STRESS    Stress echo dec 2021  freq vpc neg ischemia        CAT    COR CIARRA SCORE 1600 12/21        COR CTA FEB 2022      ELECTROPHYSIOLOGY    HOLTER NOV2021    The patient was monitored for 24 hours.  2. The predominant rhythm was sinus rhythm.  3. The average heart rate was 82 bpm.  4. The minimum heart rate was 63 bpm.  5. The maximum heart rate was 127 bpm.  6. There was 1 SVE tachycardia which lasted 4 beats 1.1 secs with an average heart rate of 159 bpm at 2:29 pm on 11/17/2021.  7. There were 07631  15% vpcpremature ventricular beats. There were 934 ventricular bigeminy cycles. There were 10 ventricular couplets          ECG   Nov 2021      Assessment/Plan     Coronary artery disease involving native coronary artery of native heart without angina pectoris  I met him because he had frequent PVCs seen on cardiac monitor during surgery for melanoma  He has 15% VPC burden seen on cardiac monitor December 2021  He has CAD based on coronary calcium score of 1600  Stress echocardiogram today December 2021 frequent ectopy no obvious ischemia but some equivocal findings due to ectopy excellent exercise tolerance  Started on statin and antiplatelet  We will rule out obstructive CAD with coronary CT angiogram  He has no cardiovascular symptoms  An ejection fraction is normal at  60%    Premature ventricular contraction  3He is asymptomatic 15% burden seen on cardiac monitor was picked up during  No indication for therapy at this time with normal LV systolic function, no symptoms  Stress echo no obvious ischemia but some clinical findings to ectopy plan coronary CT angiogram  Suspect outflow tract PVCs  Follow serial LV systolic function with yearly echocardiogram    Malignant neoplasm of large intestine (CMS/HCC)  Age 48 radiation therapy chemotherapy    Malignant melanoma of nose (CMS/HCC)  Resected resected 2021    Outflow tract PVCs  And coronary artery disease with calcium score of 1600  Stress echocardiogram today  somewhat equivocal findings due to frequent VPCs no obvious ischemia  Rule out obstructive CAD with coronary CT angiogram  Started on statin therapy and antiplatelet  Follow-up with repeat lab work i and after CT angiogram completed        ADDENDUM  I received a verbal report February 16, 2022, that the coronary CT angiogram suggested critical disease bifurcation LAD diagonal vessel  Spoke to the patient and  HE agreed to proceed with coronary angiography        This letter was generated using speech recognition software.  Please excuse any typographical errors.      Jarrett Caicedo MD  12/30/2021

## 2021-12-30 ENCOUNTER — HOSPITAL ENCOUNTER (OUTPATIENT)
Dept: CARDIOLOGY | Facility: CLINIC | Age: 61
Discharge: HOME | End: 2021-12-30
Payer: COMMERCIAL

## 2021-12-30 ENCOUNTER — OFFICE VISIT (OUTPATIENT)
Dept: CARDIOLOGY | Facility: CLINIC | Age: 61
End: 2021-12-30
Payer: COMMERCIAL

## 2021-12-30 ENCOUNTER — TELEPHONE (OUTPATIENT)
Dept: CARDIOLOGY | Facility: CLINIC | Age: 61
End: 2021-12-30

## 2021-12-30 VITALS
SYSTOLIC BLOOD PRESSURE: 122 MMHG | WEIGHT: 167 LBS | DIASTOLIC BLOOD PRESSURE: 68 MMHG | BODY MASS INDEX: 25.31 KG/M2 | HEIGHT: 68 IN

## 2021-12-30 VITALS
RESPIRATION RATE: 16 BRPM | BODY MASS INDEX: 25.34 KG/M2 | HEART RATE: 81 BPM | DIASTOLIC BLOOD PRESSURE: 68 MMHG | WEIGHT: 167.2 LBS | SYSTOLIC BLOOD PRESSURE: 122 MMHG | OXYGEN SATURATION: 98 % | HEIGHT: 68 IN

## 2021-12-30 DIAGNOSIS — I25.10 CORONARY ARTERY DISEASE INVOLVING NATIVE HEART WITHOUT ANGINA PECTORIS, UNSPECIFIED VESSEL OR LESION TYPE: Primary | ICD-10-CM

## 2021-12-30 DIAGNOSIS — R94.39 EQUIVOCAL STRESS TEST: ICD-10-CM

## 2021-12-30 DIAGNOSIS — Z82.49 FAMILY HISTORY OF CORONARY ARTERY DISEASE: ICD-10-CM

## 2021-12-30 DIAGNOSIS — I49.3 PREMATURE VENTRICULAR CONTRACTION: ICD-10-CM

## 2021-12-30 LAB
AORTIC ROOT ANNULUS: 3.5 CM
ASCENDING AORTA: 3.2 CM
AV PEAK GRADIENT: 8 MMHG
AV PEAK VELOCITY-S: 1.38 M/S
AV VALVE AREA: 2.62 CM2
BSA FOR ECHO PROCEDURE: 1.91 M2
E WAVE DECELERATION TIME: 254 MS
E/A RATIO: 1.1
E/E' RATIO: 8.6
E/LAT E' RATIO: 4.5
EDV (BP): 88.1 CM3
EF (A4C): 53.2 %
EF A2C: 57.5 %
EJECTION FRACTION: 54.5 %
ESV (BP): 40.1 CM3
FRACTIONAL SHORTENING: 30.6 %
INTERVENTRICULAR SEPTUM: 0.86 CM
LA ESV INDEX (A2C): 30.89 CM3/M2
LA/AORTA RATIO: 1.06
LAAS-AP2: 19.4 CM2
LAAS-AP4: 8.3 CM2
LAD 2D: 3.7 CM
LALD A4C: 3.79 CM
LALD A4C: 5.22 CM
LAV-S: 59 CM3
LEFT ATRIUM VOLUME INDEX: 7.64 CM3/M2
LEFT ATRIUM VOLUME: 14.6 CM3
LEFT INTERNAL DIMENSION IN SYSTOLE: 3.78 CM (ref 2.65–4.01)
LEFT VENTRICLE DIASTOLIC VOLUME INDEX: 49.53 CM3/M2
LEFT VENTRICLE DIASTOLIC VOLUME: 94.6 CM3
LEFT VENTRICLE SYSTOLIC VOLUME INDEX: 23.19 CM3/M2
LEFT VENTRICLE SYSTOLIC VOLUME: 44.3 CM3
LEFT VENTRICULAR INTERNAL DIMENSION IN DIASTOLE: 5.45 CM (ref 4.49–6.23)
LEFT VENTRICULAR POSTERIOR WALL IN END DIASTOLE: 0.86 CM (ref 0.59–1.1)
LV DIASTOLIC VOLUME: 77.9 CM3
LV ESV (APICAL 2 CHAMBER): 33.2 CM3
LVAD-AP2: 28.4 CM2
LVAD-AP4: 30.9 CM2
LVAS-AP2: 17.1 CM2
LVAS-AP4: 18.9 CM2
LVEDVI(A2C): 40.79 CM3/M2
LVEDVI(BP): 46.13 CM3/M2
LVESVI(A2C): 17.38 CM3/M2
LVESVI(BP): 20.99 CM3/M2
LVLD-AP2: 8.91 CM
LVLD-AP4: 8.43 CM
LVLS-AP2: 7.53 CM
LVLS-AP4: 6.87 CM
LVOT 2D: 2.3 CM
LVOT A: 4.15 CM2
LVOT PEAK VELOCITY: 0.87 M/S
MV E'TISSUE VEL-LAT: 0.13 M/S
MV E'TISSUE VEL-MED: 0.07 M/S
MV PEAK A VEL: 0.56 M/S
MV PEAK E VEL: 0.59 M/S
MV VALVE AREA P 1/2 METHOD: 2.97 CM2
POSTERIOR WALL: 0.86 CM
RVOT VMAX: 0.54 M/S
STRESS ANGINA INDEX: 0
STRESS BASELINE BP: NORMAL MMHG
STRESS BASELINE HR: 81 BPM
STRESS ECHO POST RECOVERY HR: 100 BPM
STRESS O2 SAT REST: 98 %
STRESS PERCENT HR: 87 %
STRESS POST ESTIMATED WORKLOAD: 12 METS
STRESS POST EXERCISE DUR MIN: 10 MIN
STRESS POST EXERCISE DUR SEC: 50 SEC
STRESS POST O2 SAT PEAK: 98 %
STRESS POST PEAK BP: NORMAL MMHG
STRESS POST PEAK HR: 137 BPM
STRESS TARGET HR: 133 BPM
Z-SCORE OF LEFT VENTRICULAR DIMENSION IN END DIASTOLE: 0.3
Z-SCORE OF LEFT VENTRICULAR DIMENSION IN END SYSTOLE: 1.17
Z-SCORE OF LEFT VENTRICULAR POSTERIOR WALL IN END DIASTOLE: 0.37

## 2021-12-30 PROCEDURE — 93351 STRESS TTE COMPLETE: CPT | Performed by: INTERNAL MEDICINE

## 2021-12-30 PROCEDURE — 99214 OFFICE O/P EST MOD 30 MIN: CPT | Performed by: INTERNAL MEDICINE

## 2021-12-30 PROCEDURE — 3008F BODY MASS INDEX DOCD: CPT | Performed by: INTERNAL MEDICINE

## 2021-12-30 RX ORDER — NAPROXEN SODIUM 220 MG/1
81 TABLET, FILM COATED ORAL DAILY
COMMUNITY

## 2021-12-30 NOTE — ASSESSMENT & PLAN NOTE
I met him because he had frequent PVCs seen on cardiac monitor during surgery for melanoma  He has 15% VPC burden seen on cardiac monitor December 2021  He has CAD based on coronary calcium score of 1600  Stress echocardiogram today December 2021 frequent ectopy no obvious ischemia but some equivocal findings due to ectopy excellent exercise tolerance  Started on statin and antiplatelet  We will rule out obstructive CAD with coronary CT angiogram  He has no cardiovascular symptoms  An ejection fraction is normal at 60%

## 2021-12-30 NOTE — TELEPHONE ENCOUNTER
Pls precert for CTA to have at [facility] NPI is [id number]; wants early February - [facility].  Thank you

## 2021-12-30 NOTE — ASSESSMENT & PLAN NOTE
3He is asymptomatic 15% burden seen on cardiac monitor was picked up during  No indication for therapy at this time with normal LV systolic function, no symptoms  Stress echo no obvious ischemia but some clinical findings to ectopy plan coronary CT angiogram  Suspect outflow tract PVCs  Follow serial LV systolic function with yearly echocardiogram

## 2022-02-09 LAB
ALBUMIN SERPL-MCNC: 4.4 G/DL (ref 3.8–4.8)
ALBUMIN/GLOB SERPL: 1.9 {RATIO} (ref 1.2–2.2)
ALP SERPL-CCNC: 86 IU/L (ref 44–121)
ALT SERPL-CCNC: 19 IU/L (ref 0–44)
AST SERPL-CCNC: 19 IU/L (ref 0–40)
BASOPHILS # BLD AUTO: 0 X10E3/UL (ref 0–0.2)
BASOPHILS NFR BLD AUTO: 1 %
BILIRUB SERPL-MCNC: 0.5 MG/DL (ref 0–1.2)
BUN SERPL-MCNC: 21 MG/DL (ref 8–27)
BUN/CREAT SERPL: 24 (ref 10–24)
CALCIUM SERPL-MCNC: 9.3 MG/DL (ref 8.6–10.2)
CHLORIDE SERPL-SCNC: 102 MMOL/L (ref 96–106)
CHOLEST SERPL-MCNC: 155 MG/DL (ref 100–199)
CO2 SERPL-SCNC: 25 MMOL/L (ref 20–29)
CREAT SERPL-MCNC: 0.87 MG/DL (ref 0.76–1.27)
EOSINOPHIL # BLD AUTO: 0.1 X10E3/UL (ref 0–0.4)
EOSINOPHIL NFR BLD AUTO: 1 %
ERYTHROCYTE [DISTWIDTH] IN BLOOD BY AUTOMATED COUNT: 13 % (ref 11.6–15.4)
GLOBULIN SER CALC-MCNC: 2.3 G/DL (ref 1.5–4.5)
GLUCOSE SERPL-MCNC: 83 MG/DL (ref 65–99)
HCT VFR BLD AUTO: 45.5 % (ref 37.5–51)
HDLC SERPL-MCNC: 60 MG/DL
HGB BLD-MCNC: 14.9 G/DL (ref 13–17.7)
IMM GRANULOCYTES # BLD AUTO: 0 X10E3/UL (ref 0–0.1)
IMM GRANULOCYTES NFR BLD AUTO: 0 %
LAB CORP EGFR IF AFRICN AM: 105 ML/MIN/1.73
LAB CORP EGFR IF NONAFRICN AM: 91 ML/MIN/1.73
LDLC SERPL CALC-MCNC: 77 MG/DL (ref 0–99)
LYMPHOCYTES # BLD AUTO: 1 X10E3/UL (ref 0.7–3.1)
LYMPHOCYTES NFR BLD AUTO: 23 %
MCH RBC QN AUTO: 26.8 PG (ref 26.6–33)
MCHC RBC AUTO-ENTMCNC: 32.7 G/DL (ref 31.5–35.7)
MCV RBC AUTO: 82 FL (ref 79–97)
MONOCYTES # BLD AUTO: 0.4 X10E3/UL (ref 0.1–0.9)
MONOCYTES NFR BLD AUTO: 8 %
NEUTROPHILS # BLD AUTO: 3 X10E3/UL (ref 1.4–7)
NEUTROPHILS NFR BLD AUTO: 67 %
PLATELET # BLD AUTO: 225 X10E3/UL (ref 150–450)
POTASSIUM SERPL-SCNC: 4.1 MMOL/L (ref 3.5–5.2)
PROT SERPL-MCNC: 6.7 G/DL (ref 6–8.5)
RBC # BLD AUTO: 5.55 X10E6/UL (ref 4.14–5.8)
SODIUM SERPL-SCNC: 142 MMOL/L (ref 134–144)
TRIGL SERPL-MCNC: 96 MG/DL (ref 0–149)
VLDLC SERPL CALC-MCNC: 18 MG/DL (ref 5–40)
WBC # BLD AUTO: 4.5 X10E3/UL (ref 3.4–10.8)

## 2022-02-14 ENCOUNTER — TELEPHONE (OUTPATIENT)
Dept: RADIOLOGY | Facility: HOSPITAL | Age: 62
End: 2022-02-14
Payer: COMMERCIAL

## 2022-02-15 NOTE — TELEPHONE ENCOUNTER
Spoke with patient and provided instructions for scheduled Cardiac CTA on 2- with arrival time of 0745.

## 2022-02-16 ENCOUNTER — HOSPITAL ENCOUNTER (OUTPATIENT)
Dept: RADIOLOGY | Facility: HOSPITAL | Age: 62
Discharge: HOME | End: 2022-02-16
Attending: INTERNAL MEDICINE
Payer: COMMERCIAL

## 2022-02-16 ENCOUNTER — TELEPHONE (OUTPATIENT)
Dept: CARDIOLOGY | Facility: CLINIC | Age: 62
End: 2022-02-16
Payer: COMMERCIAL

## 2022-02-16 VITALS
BODY MASS INDEX: 25.76 KG/M2 | HEART RATE: 60 BPM | DIASTOLIC BLOOD PRESSURE: 69 MMHG | HEIGHT: 68 IN | OXYGEN SATURATION: 97 % | RESPIRATION RATE: 16 BRPM | WEIGHT: 170 LBS | SYSTOLIC BLOOD PRESSURE: 124 MMHG

## 2022-02-16 DIAGNOSIS — R94.39 EQUIVOCAL STRESS TEST: ICD-10-CM

## 2022-02-16 DIAGNOSIS — I25.10 CORONARY ARTERY DISEASE INVOLVING NATIVE HEART WITHOUT ANGINA PECTORIS, UNSPECIFIED VESSEL OR LESION TYPE: ICD-10-CM

## 2022-02-16 DIAGNOSIS — I25.10 CORONARY ARTERY DISEASE INVOLVING NATIVE HEART, UNSPECIFIED VESSEL OR LESION TYPE, UNSPECIFIED WHETHER ANGINA PRESENT: Primary | ICD-10-CM

## 2022-02-16 PROCEDURE — 63700000 HC SELF-ADMINISTRABLE DRUG: Performed by: INTERNAL MEDICINE

## 2022-02-16 PROCEDURE — 4A033BC MEASUREMENT OF ARTERIAL PRESSURE, CORONARY, PERCUTANEOUS APPROACH: ICD-10-PCS | Performed by: INTERNAL MEDICINE

## 2022-02-16 PROCEDURE — 75574 CT ANGIO HRT W/3D IMAGE: CPT | Mod: MG

## 2022-02-16 PROCEDURE — 63600105 HC IODINE BASED CONTRAST: Performed by: INTERNAL MEDICINE

## 2022-02-16 PROCEDURE — 0503T HC COR FFR ALYS GNRJ FFR MDL: CPT

## 2022-02-16 RX ORDER — METOPROLOL TARTRATE 1 MG/ML
5 INJECTION, SOLUTION INTRAVENOUS AS NEEDED
Status: DISCONTINUED | OUTPATIENT
Start: 2022-02-16 | End: 2022-02-17 | Stop reason: HOSPADM

## 2022-02-16 RX ORDER — METOPROLOL TARTRATE 50 MG/1
100 TABLET ORAL ONCE
Status: COMPLETED | OUTPATIENT
Start: 2022-02-16 | End: 2022-02-16

## 2022-02-16 RX ORDER — IVABRADINE 7.5 MG/1
15 TABLET, FILM COATED ORAL ONCE
Status: COMPLETED | OUTPATIENT
Start: 2022-02-16 | End: 2022-02-16

## 2022-02-16 RX ORDER — NITROGLYCERIN 0.4 MG/1
0.4 TABLET SUBLINGUAL ONCE
Status: COMPLETED | OUTPATIENT
Start: 2022-02-16 | End: 2022-02-16

## 2022-02-16 RX ORDER — METOPROLOL TARTRATE 50 MG/1
50 TABLET ORAL EVERY 30 MIN PRN
Status: DISCONTINUED | OUTPATIENT
Start: 2022-02-16 | End: 2022-02-17 | Stop reason: HOSPADM

## 2022-02-16 RX ADMIN — NITROGLYCERIN 0.4 MG: 0.4 TABLET SUBLINGUAL at 09:39

## 2022-02-16 RX ADMIN — METOPROLOL TARTRATE 100 MG: 50 TABLET, FILM COATED ORAL at 08:32

## 2022-02-16 RX ADMIN — METOPROLOL TARTRATE 50 MG: 50 TABLET, FILM COATED ORAL at 09:06

## 2022-02-16 RX ADMIN — IVABRADINE 15 MG: 7.5 TABLET, FILM COATED ORAL at 08:31

## 2022-02-16 RX ADMIN — IOHEXOL 100 ML: 350 INJECTION, SOLUTION INTRAVENOUS at 10:39

## 2022-02-16 NOTE — TELEPHONE ENCOUNTER
Please call patient and schedule Left Heart Cath with Dr. Philip.    Orders for Cath and COVID are in.    Thanks!    Hue

## 2022-02-16 NOTE — DISCHARGE INSTRUCTIONS
Dane Beckwith   [id number]   02/16/22    Cardiac CTA Patient Discharge Instructions      Thank you for allowing our CT staff to participate in your care today.  We would like to provide you with some easy to follow instructions before you leave.    DRINK PLENTY OF FLUIDS  Now that your scan is complete, you will need to drink plenty of fluids, preferably water.    DO NOT drink any caffeinated beverages the rest of the day (coffee, tea, caffeinated sodas).    DO NOT drink any alcoholic beverages for the next 24 hours.  We ask you to drink these fluids to dilute the x-ray dye that was used for your scan and allow it to flush through your kidneys.  Caffeine and alcohol will not allow this flushing to occur effectively.       MEDICATION CHANGES:  {YES/NO/WILD CARDS:70458}    If you have any questions about this, please contact your primary care physician.    If you need immediate medical attention, please go to the nearest Emergency Department or dial 911.    By signing this form, I acknowledge these instructions have been explained to me to my satisfaction and all my questions have been answered.  I have received a copy of this form.

## 2022-02-16 NOTE — PROGRESS NOTES
Staff present during Radiology Nurse encounter:    Nurse: Darryl Silva RN  Technologist: FOREIGN Varner    Cardiologist: Dr ROLA Ernandez  Other: 20 g left acf piv, Ivabradine 15mg, Lopressor 150mg chewed, Ntg 0.4mg sl Contrast 100ml Retrospective study.  Cardiac CTA Worksheet    Chest pain (symptomatic patients):  Intermediate pre-test probability of Coronary Artery Disease        Ivabradine (CORLANOR) 15mg, metoprolol tartrate (LOPRESSOR) 150mg and nitroglycerin (NITROSTAT) .4mg

## 2022-02-17 NOTE — TELEPHONE ENCOUNTER
I called pt to schedule LHC. Pt stated he is not available on Tuesdays or Thursdays. I scheduled LHC with Dr Philip on  Friday 3/4/2022.

## 2022-02-28 ENCOUNTER — APPOINTMENT (OUTPATIENT)
Dept: LAB | Facility: CLINIC | Age: 62
End: 2022-02-28
Attending: INTERNAL MEDICINE
Payer: COMMERCIAL

## 2022-02-28 DIAGNOSIS — I25.10 CORONARY ARTERY DISEASE INVOLVING NATIVE HEART, UNSPECIFIED VESSEL OR LESION TYPE, UNSPECIFIED WHETHER ANGINA PRESENT: ICD-10-CM

## 2022-02-28 PROCEDURE — C9803 HOPD COVID-19 SPEC COLLECT: HCPCS

## 2022-02-28 PROCEDURE — U0003 INFECTIOUS AGENT DETECTION BY NUCLEIC ACID (DNA OR RNA); SEVERE ACUTE RESPIRATORY SYNDROME CORONAVIRUS 2 (SARS-COV-2) (CORONAVIRUS DISEASE [COVID-19]), AMPLIFIED PROBE TECHNIQUE, MAKING USE OF HIGH THROUGHPUT TECHNOLOGIES AS DESCRIBED BY CMS-2020-01-R: HCPCS

## 2022-03-01 LAB — SARS-COV-2 RNA RESP QL NAA+PROBE: NEGATIVE

## 2022-03-03 NOTE — PROGRESS NOTES
Cardiology Consult/New Patient    Alexander Duron MD          Dane Beckwith is a 62 y.o. male identifies as who presents with     He returns after cardiac intervention, coronary stent to the mid LAD March 2022   he has residual disease of the circumflex marginal but not require intervention, small vessel, images reviewed    I had met him to evaluate him for PVCs that were seen during a monitor during surgery for melanoma on his nose  Holter showed 15% burden December 2021 outflow tract PVCs  His work-up included a coronary calcium score that showed a coronary calcium score of 1600  Stress echo had equivocal findings which led to cardiac catheterization    Melanoma of the nose resected 2021 cancer large intestine radiation surgery age 48          Component Ref Range & Units 2/9/22 1045 10/6/21 0854 10/1/20 0940 8/14/19 1028 11/14/17     Cholesterol, Total 100 - 199 mg/dL 155  210 High   203 High   200 High   172 R     Triglycerides 0 - 149 mg/dL 96  91  82  99  80 R     HDL Cholesterol >39 mg/dL 60  59  61  59  57 R     VLDL Cholesterol Juventino 5 - 40 mg/dL 18  16  15  20      LDL Cholesterol Calc 0 - 99 mg/dL 77                                  t        Patient Active Problem List    Diagnosis Date Noted   • Post PTCA 03/07/2022   • Coronary artery disease involving native coronary artery of native heart without angina pectoris 12/30/2021   • Mixed hyperlipidemia 11/17/2021   • Family history of coronary artery disease 11/17/2021   • Malignant melanoma of nose (CMS/HCC) 10/06/2021   • Melanoma of back (CMS/HCC) 10/06/2021   • Premature ventricular contraction 10/06/2021   • Anemia 11/13/2017   • Malignant neoplasm of large intestine (CMS/HCC) 11/13/2017       Medical History:   Past Medical History:   Diagnosis Date   • Allergic    • Colon cancer (CMS/HCC)     + radiation, chemo and surgery    • Coronary artery disease    • Malignant melanoma of nose (CMS/HCC) 10/6/2021   • Melanoma (CMS/HCC)    • Melanoma of back  (CMS/[facility]) 10/6/2021   • Premature ventricular contraction 10/6/2021       Surgical History:   Past Surgical History:   Procedure Laterality Date   • COLECTOMY     • SKIN BIOPSY      nose for melanoma       Allergies: Amoxicillin trihydrate, Penicillins, and Potassium clavulanate    Current Outpatient Medications   Medication Sig Dispense Refill   • ascorbic acid (VITAMIN C ORAL) Take by mouth.     • aspirin 81 mg chewable tablet Take 81 mg by mouth daily.     • cholecalciferol, vitamin D3, (VITAMIN D3 ORAL) Take by mouth.     • clopidogreL (PLAVIX) 75 mg tablet Take 1 tablet (75 mg total) by mouth daily. 90 tablet 6   • rosuvastatin (CRESTOR) 20 mg tablet Take 1 tablet (20 mg total) by mouth daily. 90 tablet 6     No current facility-administered medications for this visit.       Social History:   Social History     Socioeconomic History   • Marital status:      Spouse name: None   • Number of children: None   • Years of education: None   • Highest education level: None   Occupational History   • None   Tobacco Use   • Smoking status: Never Smoker   • Smokeless tobacco: Never Used   Vaping Use   • Vaping Use: Never used   Substance and Sexual Activity   • Alcohol use: Yes     Comment: 6-7/wk   • Drug use: No   • Sexual activity: Yes     Partners: Female   Other Topics Concern   • None   Social History Narrative    Do you wear your seatbelt? Yes    Do you have smoke detector in your home? Yes    Do you have a carbon monoxide detector in your home? 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      Social Determinants of Health     Financial Resource Strain: Not on file   Food Insecurity: Not on file   Transportation Needs: Not on file   Physical Activity: Not on file   Stress: Not on file   Social Connections: Not on file   Intimate Partner Violence: Not on file   Housing Stability: Not on file       Family History:   Family History   Problem Relation Age of Onset   • Breast cancer  Biological Mother    • Colon cancer Biological Father    • Heart disease Biological Father    • Heart disease Father's Brother    • Hyperlipidemia Biological Brother    • Hypertension Biological Brother    • Hyperlipidemia Biological Brother    • Hypertension Biological Brother        Review of Systems   ROS    Objective       Vitals:    03/09/22 0818   BP: 108/68   Pulse: 72   SpO2: 98%       Physical Exam  Constitutional:       General: He is not in acute distress.     Appearance: Normal appearance. He is well-developed. He is not ill-appearing, toxic-appearing or diaphoretic.      Interventions: He is not intubated.  HENT:      Head: Normocephalic. Not microcephalic. No raccoon eyes, abrasion or contusion.      Nose: Nose normal.   Eyes:      General: No scleral icterus.        Left eye: No discharge.      Conjunctiva/sclera:      Right eye: Right conjunctiva is not injected.      Left eye: Left conjunctiva is not injected. No exudate or hemorrhage.     Pupils: Pupils are equal.   Neck:      Vascular: Normal carotid pulses. No carotid bruit or hepatojugular reflux.   Cardiovascular:      Rate and Rhythm: Normal rate and regular rhythm.      Chest Wall: PMI is not displaced.      Pulses: Normal pulses and intact distal pulses.      Heart sounds: Normal heart sounds. No murmur heard.    No friction rub. No gallop.   Pulmonary:      Effort: Pulmonary effort is normal. No tachypnea, bradypnea or respiratory distress. He is not intubated.      Breath sounds: Normal breath sounds. No stridor. No wheezing or rales.   Chest:      Chest wall: No tenderness.   Abdominal:      General: Bowel sounds are normal. There is no distension.      Palpations: Abdomen is soft.      Tenderness: There is no abdominal tenderness.   Musculoskeletal:         General: No deformity. Normal range of motion.      Cervical back: Normal range of motion and neck supple.   Skin:     Coloration: Skin is not pale.      Findings: No abrasion,  bruising, ecchymosis, erythema or rash.   Neurological:      Mental Status: He is alert and oriented to person, place, and time.   Psychiatric:         Mood and Affect: Mood is not anxious or depressed. Affect is not labile, blunt, angry or inappropriate.         Speech: He is communicative. Speech is not slurred.         Behavior: Behavior is not agitated, aggressive, withdrawn or combative.          Labs   Lab Results   Component Value Date    WBC 4.96 03/05/2022    HGB 15.6 03/05/2022    HCT 47.4 03/05/2022     03/05/2022    CHOL 155 02/09/2022    TRIG 96 02/09/2022    HDL 60 02/09/2022    ALT 19 02/09/2022    AST 19 02/09/2022     03/05/2022    K 4.5 03/05/2022     03/05/2022    CREATININE 0.9 03/05/2022    BUN 16 03/05/2022    CO2 23 03/05/2022    PSA 0.5 10/06/2021   [id number]    Imaging    CATH    PTCA MARCH 2022    FINDINGS:  1. 80% mid-LAD stenosis.   2. 80% mid-ramus stenosis.   3. Nonobstructive disease throughout the remainder of the epicardial coronaries.      INTERVENTION:   Successful PCI of  LAD with a  3.98u35ri Synergy BISI was deployed to 12 makenna. It was postdilated with a 3.0x12mm noncompliant balloon to 16 makenna          STRESS    Stress echo dec 2021  freq vpc neg ischemia  Somewhat equivocal stress echocardiogram due to frequent PVCs  No obvious ischemia with stress echocardiogram  The patient completed 12 METS of exercise completed stage IV excellent exercise tolerance  Baseline EKG frequent PVCs with stress exercise and recovery no complex arrhythmia  Baseline echocardiogram normal chamber sizes no regional wall motion abnormalities preserved ejection fraction 55%  No structural valvular disease  Trace mitral regurgitation  No obvious regional wall motion abnormalities all walls appear to be hyperdynamic with exercise        CAT    COR CIARRA SCORE 1600 12/21        COR CTA FEB 2022      ELECTROPHYSIOLOGY    HOLTER NOV2021    The patient was monitored for 24 hours.  2. The  predominant rhythm was sinus rhythm.  3. The average heart rate was 82 bpm.  4. The minimum heart rate was 63 bpm.  5. The maximum heart rate was 127 bpm.  6. There was 1 SVE tachycardia which lasted 4 beats 1.1 secs with an average heart rate of 159 bpm at 2:29 pm on 11/17/2021.  7. There were 70752  15% vpcpremature ventricular beats. There were 934 ventricular bigeminy cycles. There were 10 ventricular couplets          ECG       March 2022 Nov 2021      Assessment/Plan     Post PTCA  As part of work-up for outflow tract PVCs he had stress echocardiogram in coronary calcium score he has normal LV systolic function calcium score over 1600 CTA suggested mid LAD lesion underwent cardiac catheterization had successful stent of mid LAD lesion March 2022 Synergy stent residual lesion and small ramus branch no intervention required  Dual antiplatelet for 1 year      Premature ventricular contraction  Outflow tract PVCs 15% burden seen on Holter monitor nov 2021  He is asymptomatic normal LV systolic function no need for chronic therapy repeat echocardiogram in 1 year will order at next visit indication for therapy would be LV dysfunction or symptoms    Mixed hyperlipidemia  Just started on rosuvastatin LDL cholesterol 77    Malignant melanoma of nose (CMS/HCC)  Resected 2021    Malignant neoplasm of large intestine (CMS/HCC)  Chemotherapy radiation age 48 for routine colonoscopy if possible would not interrupt dual antiplatelet until March 2023 after recent coronary stent              Status post stent in the mid LAD March 2022  Residual lesion small ramus branch no intervention required  Outflow tract PVCs normal LV systolic function no therapy  Follow-up in 6 months with lab work only  Reevaluate with coronary CT angiogram 2 to 3 years                This letter was generated using speech recognition software.  Please excuse any typographical errors.      Jarrett Caicedo MD  3/9/2022

## 2022-03-04 ENCOUNTER — HOSPITAL ENCOUNTER (OUTPATIENT)
Facility: HOSPITAL | Age: 62
Discharge: HOME | End: 2022-03-05
Attending: INTERNAL MEDICINE | Admitting: INTERNAL MEDICINE
Payer: COMMERCIAL

## 2022-03-04 DIAGNOSIS — I25.10 CORONARY ARTERY DISEASE INVOLVING NATIVE HEART, UNSPECIFIED VESSEL OR LESION TYPE, UNSPECIFIED WHETHER ANGINA PRESENT: ICD-10-CM

## 2022-03-04 DIAGNOSIS — I25.10 CORONARY ARTERY DISEASE INVOLVING NATIVE CORONARY ARTERY OF NATIVE HEART WITHOUT ANGINA PECTORIS: Primary | ICD-10-CM

## 2022-03-04 PROCEDURE — C1769 GUIDE WIRE: HCPCS | Performed by: INTERNAL MEDICINE

## 2022-03-04 PROCEDURE — 85347 COAGULATION TIME ACTIVATED: CPT | Performed by: INTERNAL MEDICINE

## 2022-03-04 PROCEDURE — 99152 MOD SED SAME PHYS/QHP 5/>YRS: CPT | Performed by: INTERNAL MEDICINE

## 2022-03-04 PROCEDURE — 71000011 HC PACU PHASE 1 EA ADDL MIN: Performed by: INTERNAL MEDICINE

## 2022-03-04 PROCEDURE — C1887 CATHETER, GUIDING: HCPCS | Performed by: INTERNAL MEDICINE

## 2022-03-04 PROCEDURE — 27200000 HC STERILE SUPPLY: Performed by: INTERNAL MEDICINE

## 2022-03-04 PROCEDURE — 99153 MOD SED SAME PHYS/QHP EA: CPT | Performed by: INTERNAL MEDICINE

## 2022-03-04 PROCEDURE — C1874 STENT, COATED/COV W/DEL SYS: HCPCS | Performed by: INTERNAL MEDICINE

## 2022-03-04 PROCEDURE — 63700000 HC SELF-ADMINISTRABLE DRUG: Performed by: INTERNAL MEDICINE

## 2022-03-04 PROCEDURE — 027034Z DILATION OF CORONARY ARTERY, ONE ARTERY WITH DRUG-ELUTING INTRALUMINAL DEVICE, PERCUTANEOUS APPROACH: ICD-10-PCS | Performed by: INTERNAL MEDICINE

## 2022-03-04 PROCEDURE — 25000000 HC PHARMACY GENERAL: Performed by: INTERNAL MEDICINE

## 2022-03-04 PROCEDURE — 93454 CORONARY ARTERY ANGIO S&I: CPT | Mod: 26,XU | Performed by: INTERNAL MEDICINE

## 2022-03-04 PROCEDURE — B2111ZZ FLUOROSCOPY OF MULTIPLE CORONARY ARTERIES USING LOW OSMOLAR CONTRAST: ICD-10-PCS | Performed by: INTERNAL MEDICINE

## 2022-03-04 PROCEDURE — C1726 CATH, BAL DIL, NON-VASCULAR: HCPCS | Performed by: INTERNAL MEDICINE

## 2022-03-04 PROCEDURE — C1894 INTRO/SHEATH, NON-LASER: HCPCS | Performed by: INTERNAL MEDICINE

## 2022-03-04 PROCEDURE — 71000001 HC PACU PHASE 1 INITIAL 30MIN: Performed by: INTERNAL MEDICINE

## 2022-03-04 PROCEDURE — C9600 PERC DRUG-EL COR STENT SING: HCPCS | Mod: LD | Performed by: INTERNAL MEDICINE

## 2022-03-04 PROCEDURE — 63600000 HC DRUGS/DETAIL CODE: Performed by: INTERNAL MEDICINE

## 2022-03-04 PROCEDURE — C1725 CATH, TRANSLUMIN NON-LASER: HCPCS | Performed by: INTERNAL MEDICINE

## 2022-03-04 PROCEDURE — 93454 CORONARY ARTERY ANGIO S&I: CPT | Mod: XU | Performed by: INTERNAL MEDICINE

## 2022-03-04 PROCEDURE — 92928 PRQ TCAT PLMT NTRAC ST 1 LES: CPT | Mod: LD | Performed by: INTERNAL MEDICINE

## 2022-03-04 PROCEDURE — 63600105 HC IODINE BASED CONTRAST: Mod: JW | Performed by: INTERNAL MEDICINE

## 2022-03-04 DEVICE — EVEROLIMUS-ELUTING PLATINUM CHROMIUM CORONARY STENT SYSTEM
Type: IMPLANTABLE DEVICE | Site: CORONARY | Status: FUNCTIONAL
Brand: SYNERGY™ XD

## 2022-03-04 RX ORDER — ACETAMINOPHEN 325 MG/1
650 TABLET ORAL EVERY 4 HOURS PRN
Status: DISCONTINUED | OUTPATIENT
Start: 2022-03-04 | End: 2022-03-05 | Stop reason: HOSPADM

## 2022-03-04 RX ORDER — CLOPIDOGREL BISULFATE 75 MG/1
75 TABLET ORAL DAILY
Qty: 30 TABLET | Refills: 3 | Status: SHIPPED | OUTPATIENT
Start: 2022-03-04 | End: 2022-03-09 | Stop reason: SDUPTHER

## 2022-03-04 RX ORDER — DEXTROSE 40 %
15-30 GEL (GRAM) ORAL AS NEEDED
Status: DISCONTINUED | OUTPATIENT
Start: 2022-03-04 | End: 2022-03-05 | Stop reason: HOSPADM

## 2022-03-04 RX ORDER — IBUPROFEN 200 MG
16-32 TABLET ORAL AS NEEDED
Status: DISCONTINUED | OUTPATIENT
Start: 2022-03-04 | End: 2022-03-05 | Stop reason: HOSPADM

## 2022-03-04 RX ORDER — ATROPINE SULFATE 0.1 MG/ML
0.5 INJECTION INTRAVENOUS EVERY 5 MIN PRN
Status: DISCONTINUED | OUTPATIENT
Start: 2022-03-04 | End: 2022-03-05 | Stop reason: HOSPADM

## 2022-03-04 RX ORDER — LIDOCAINE HYDROCHLORIDE 10 MG/ML
INJECTION, SOLUTION INFILTRATION; PERINEURAL
Status: DISCONTINUED | OUTPATIENT
Start: 2022-03-04 | End: 2022-03-04 | Stop reason: HOSPADM

## 2022-03-04 RX ORDER — CLOPIDOGREL BISULFATE 75 MG/1
75 TABLET ORAL DAILY
Status: DISCONTINUED | OUTPATIENT
Start: 2022-03-05 | End: 2022-03-05 | Stop reason: HOSPADM

## 2022-03-04 RX ORDER — CLOPIDOGREL BISULFATE 75 MG/1
TABLET ORAL
Status: DISCONTINUED | OUTPATIENT
Start: 2022-03-04 | End: 2022-03-04 | Stop reason: HOSPADM

## 2022-03-04 RX ORDER — HEPARIN SODIUM 1000 [USP'U]/ML
INJECTION, SOLUTION INTRAVENOUS; SUBCUTANEOUS
Status: DISCONTINUED | OUTPATIENT
Start: 2022-03-04 | End: 2022-03-04 | Stop reason: HOSPADM

## 2022-03-04 RX ORDER — ASPIRIN 325 MG
325 TABLET ORAL ONCE
Status: COMPLETED | OUTPATIENT
Start: 2022-03-04 | End: 2022-03-04

## 2022-03-04 RX ORDER — ROSUVASTATIN CALCIUM 20 MG/1
20 TABLET, COATED ORAL DAILY
Status: DISCONTINUED | OUTPATIENT
Start: 2022-03-05 | End: 2022-03-05 | Stop reason: HOSPADM

## 2022-03-04 RX ORDER — DEXTROSE 50 % IN WATER (D50W) INTRAVENOUS SYRINGE
25 AS NEEDED
Status: DISCONTINUED | OUTPATIENT
Start: 2022-03-04 | End: 2022-03-05 | Stop reason: HOSPADM

## 2022-03-04 RX ORDER — NAPROXEN SODIUM 220 MG/1
81 TABLET, FILM COATED ORAL DAILY
Status: DISCONTINUED | OUTPATIENT
Start: 2022-03-05 | End: 2022-03-05 | Stop reason: HOSPADM

## 2022-03-04 RX ORDER — MIDAZOLAM HYDROCHLORIDE 2 MG/2ML
INJECTION, SOLUTION INTRAMUSCULAR; INTRAVENOUS
Status: DISCONTINUED | OUTPATIENT
Start: 2022-03-04 | End: 2022-03-04 | Stop reason: HOSPADM

## 2022-03-04 RX ORDER — IODIXANOL 320 MG/ML
INJECTION, SOLUTION INTRAVASCULAR
Status: DISCONTINUED | OUTPATIENT
Start: 2022-03-04 | End: 2022-03-04 | Stop reason: HOSPADM

## 2022-03-04 RX ORDER — NITROGLYCERIN 0.4 MG/1
0.4 TABLET SUBLINGUAL EVERY 5 MIN PRN
Status: DISCONTINUED | OUTPATIENT
Start: 2022-03-04 | End: 2022-03-05 | Stop reason: HOSPADM

## 2022-03-04 RX ORDER — FENTANYL CITRATE 50 UG/ML
INJECTION, SOLUTION INTRAMUSCULAR; INTRAVENOUS
Status: DISCONTINUED | OUTPATIENT
Start: 2022-03-04 | End: 2022-03-04 | Stop reason: HOSPADM

## 2022-03-04 RX ADMIN — ASPIRIN 325 MG: 325 TABLET ORAL at 11:16

## 2022-03-04 NOTE — POST-PROCEDURE NOTE
Patient transported to room 173 on monitor with CCL RN. Bedside handoff completed with receiving RN Agatha. Wrist site assessment completed. Cardiac monitoring continued.

## 2022-03-04 NOTE — Clinical Note
Stent deployed in the mid left anterior descending. Pressure = 12 makenna. Inflation time =  15 seconds.

## 2022-03-04 NOTE — ASSESSMENT & PLAN NOTE
C/w statin  Lab Results   Component Value Date    CHOL 155 02/09/2022    CHOL 210 (H) 10/06/2021    CHOL 203 (H) 10/01/2020     Lab Results   Component Value Date    HDL 60 02/09/2022    HDL 59 10/06/2021    HDL 61 10/01/2020     Lab Results   Component Value Date    LDLCALC 77 02/09/2022    LDLCALC 135 (H) 10/06/2021    LDLCALC 127 (H) 10/01/2020     Lab Results   Component Value Date    TRIG 96 02/09/2022    TRIG 91 10/06/2021    TRIG 82 10/01/2020

## 2022-03-04 NOTE — ASSESSMENT & PLAN NOTE
Underwent PCI of the mid LAD  Doing well this morning  I emphasized the importance of strict compliance with dual antiplatelet therapy to reduce the risk of heart attacks  Can be discharged home today

## 2022-03-04 NOTE — H&P
Cardiology History and Physical     Admitting Diagnosis   Coronary artery disease involving native heart, unspecified vessel or lesion type, unspecified whether angina present [I25.10]   HPI    Dane Beckwith is a 62 y.o. male with PMH of colon ca, melanoma, dyslipidemia who presents to [facility] for cardiac catheterization. Pt notes while having surgery for melanoma, VPC s were noted. Pt  Was seen by Dr Caicedo. Coronary calcium score 1678. Stress echo Somewhat equivocal stress echocardiogram due to frequent PVCs  No obvious ischemia with stress echocardiogram  The patient completed 12 METS of exercise completed stage IV excellent exercise tolerance. Baseline EKG frequent PVCs with stress exercise and recovery no complex arrhythmia Baseline echocardiogram normal chamber sizes no regional wall motion abnormalities preserved ejection fraction 55%. No structural valvular disease  Trace mitral regurgitation.No obvious regional wall motion abnormalities all walls appear to be hyperdynamic with exercise CTA coronary calcium score: 1787. Severe single vessel disease involving the left anterior descending and diagonal with abnormal FFR CT.2. Cardiac Structure:  Grossly normal cardiac structures.3. Left Ventricular size and function:  Normal LV size, ejection fraction 48%.4. Coronary calcium score 1787.  This places the patient in the VALENZUELA 97th riskpercentile for age and gender matched individuals.5. Overall quality of the scan was fair. Pt denies cp or sob.    For cardiac catheterization today         Medical History   Medical History:   Past Medical History:   Diagnosis Date   • Allergic    • Colon cancer (CMS/HCC)     + radiation, chemo and surgery    • Coronary artery disease    • Malignant melanoma of nose (CMS/HCC) 10/6/2021   • Melanoma (CMS/HCC)    • Melanoma of back (CMS/HCC) 10/6/2021   • Premature ventricular contraction 10/6/2021       Surgical History:   Past Surgical History:   Procedure Laterality Date   •  COLECTOMY     • SKIN BIOPSY      nose for melanoma       Allergies: Amoxicillin trihydrate, Penicillins, and Potassium clavulanate    Current Facility-Administered Medications   Medication Dose Route Frequency Provider Last Rate Last Admin   • aspirin tablet 325 mg  325 mg oral Once Manda Philip MD           Social History:   Social History     Socioeconomic History   • Marital status:      Spouse name: None   • Number of children: None   • Years of education: None   • Highest education level: None   Occupational History   • None   Tobacco Use   • Smoking status: Never Smoker   • Smokeless tobacco: Never Used   Vaping Use   • Vaping Use: Never used   Substance and Sexual Activity   • Alcohol use: Yes     Comment: 6-7/wk   • Drug use: No   • Sexual activity: Yes     Partners: Female   Other Topics Concern   • None   Social History Narrative    Do you wear your seatbelt? Yes    Do you have smoke detector in your home? Yes    Do you have a carbon monoxide detector in your home? 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      Social Determinants of Health     Financial Resource Strain: Not on file   Food Insecurity: Not on file   Transportation Needs: Not on file   Physical Activity: Not on file   Stress: Not on file   Social Connections: Not on file   Intimate Partner Violence: Not on file   Housing Stability: Not on file       Family History:   Family History   Problem Relation Age of Onset   • Breast cancer Biological Mother    • Colon cancer Biological Father    • Heart disease Biological Father    • Heart disease Father's Brother    • Hyperlipidemia Biological Brother    • Hypertension Biological Brother    • Hyperlipidemia Biological Brother    • Hypertension Biological Brother          Review of Systems   All other systems reviewed and negative except as noted in the HPI.   Objective    Vital Signs for the last 24 hours   Temp:  [36.6 °C (97.8 °F)] 36.6 °C (97.8 °F)  Heart  Rate:  [76] 76  Resp:  [51] 51  BP: (175)/(77) 175/77       Physical Exam   Visit Vitals  BP (!) 175/77   Pulse 76   Temp 36.6 °C (97.8 °F) (Temporal)   Resp (!) 51   Wt 78.5 kg (173 lb)   SpO2 96%   BMI 26.30 kg/m²     General appearance: alert, appears stated age and cooperative  Head: normocephalic, without obvious abnormality, atraumatic  Lungs: clear to auscultation bilaterally  Heart: regular rate and rhythm, S1, S2 normal, no murmur, click, rub or gallop  Abdomen: soft, non-tender; bowel sounds normal; no masses, no organomegaly  Extremities: extremities normal, warm and well-perfused; no cyanosis, clubbing, or edema  Pulses: 2+ and symmetric bilateral DP  Skin: Skin color, texture, turgor normal. No rashes or lesions  Neurologic: Grossly normal      Labs   Lab Results   Component Value Date    WBC 4.5 02/09/2022    HGB 14.9 02/09/2022    HCT 45.5 02/09/2022     02/09/2022    CHOL 155 02/09/2022    TRIG 96 02/09/2022    HDL 60 02/09/2022    ALT 19 02/09/2022    AST 19 02/09/2022     02/09/2022    K 4.1 02/09/2022     02/09/2022    CREATININE 0.87 02/09/2022    BUN 21 02/09/2022    CO2 25 02/09/2022    PSA 0.5 10/06/2021     Troponin I Results    No lab values to display.           Imaging        Cardiac Imaging    ECHOCARDIOGRAM STRESS TEST 12/30/2021    Interpretation Summary  Somewhat equivocal stress echocardiogram due to frequent PVCs  No obvious ischemia with stress echocardiogram  The patient completed 12 METS of exercise completed stage IV excellent exercise tolerance  Baseline EKG frequent PVCs with stress exercise and recovery no complex arrhythmia  Baseline echocardiogram normal chamber sizes no regional wall motion abnormalities preserved ejection fraction 55%  No structural valvular disease  Trace mitral regurgitation  No obvious regional wall motion abnormalities all walls appear to be hyperdynamic with exercise         ECG        Telemetry   SR ,pvcs      Assessment/Plan       Coronary artery disease involving native coronary artery of native heart without angina pectoris  Pt notes he had a procedure for melanoma, frequent VPCs found  Pt's primary recommended pt seen cardiologist  Pt has had a cardiac monitor showed large burden of PVCs 15% burden  Coronary calcium score dated diagnosis of extensive arterial sclerosis with a score over 1600  Pt had a stress echo 12/30/21: Somewhat equivocal stress echocardiogram due to frequent PVCs  No obvious ischemia with stress echocardiogram  The patient completed 12 METS of exercise completed stage IV excellent exercise tolerance  Baseline EKG frequent PVCs with stress exercise and recovery no complex arrhythmia  Baseline echocardiogram normal chamber sizes no regional wall motion abnormalities preserved ejection fraction 55%  No structural valvular disease  Trace mitral regurgitation  No obvious regional wall motion abnormalities all walls appear to be hyperdynamic with exercise     2/16/22 CTA . Coronary Arteries:  Severe single vessel disease involving the left anterior  descending and diagonal with abnormal FFR CT.  2. Cardiac Structure:  Grossly normal cardiac structures.  3. Left Ventricular size and function:  Normal LV size, ejection fraction 48%.  4. Coronary calcium score 1787.  This places the patient in the VALENZUELA 97th risk  percentile for age and gender matched individuals.  5. Overall quality of the scan was fair.      For cath today  Plan pending cath results   On asa and statin     Mixed hyperlipidemia  C/w statin  Lab Results   Component Value Date    CHOL 155 02/09/2022    CHOL 210 (H) 10/06/2021    CHOL 203 (H) 10/01/2020     Lab Results   Component Value Date    HDL 60 02/09/2022    HDL 59 10/06/2021    HDL 61 10/01/2020     Lab Results   Component Value Date    LDLCALC 77 02/09/2022    LDLCALC 135 (H) 10/06/2021    LDLCALC 127 (H) 10/01/2020     Lab Results   Component Value Date    TRIG 96 02/09/2022    TRIG 91 10/06/2021     TRIG 82 10/01/2020         Premature ventricular contraction  HOLTER Monitor  1. The patient was monitored for 24 hours.  2. The predominant rhythm was sinus rhythm.  3. The average heart rate was 82 bpm.  4. The minimum heart rate was 63 bpm.  5. The maximum heart rate was 127 bpm.  6. There was 1 SVE tachycardia which lasted 4 beats 1.1 secs with an average heart rate of 159 bpm at 2:29 pm on 11/17/2021.  7. There were 92333 premature ventricular beats. There were 934 ventricular bigeminy cycles. There were 10 ventricular couplets.  8. Patients' diary was not submitted.  11/19/2021                      SULEMA Chance  3/4/2022  11:11 AM

## 2022-03-04 NOTE — ASSESSMENT & PLAN NOTE
HOLTER Monitor  1. The patient was monitored for 24 hours.  2. The predominant rhythm was sinus rhythm.  3. The average heart rate was 82 bpm.  4. The minimum heart rate was 63 bpm.  5. The maximum heart rate was 127 bpm.  6. There was 1 SVE tachycardia which lasted 4 beats 1.1 secs with an average heart rate of 159 bpm at 2:29 pm on 11/17/2021.  7. There were 19244 premature ventricular beats. There were 934 ventricular bigeminy cycles. There were 10 ventricular couplets.  8. Patients' diary was not submitted.  11/19/2021

## 2022-03-04 NOTE — PRE-PROCEDURE NOTE
Cardiac Cath Lab Pre-procedure Note    - Patient was seen and examined at bedside.  - The patient's chart and all data was reviewed.  - The procedure, treatment alternatives, risks and benefits were explained with specific risks discussed.  - Patient was consented for cardiac cath procedure and possible PCI.  - Patient's case was found appropriate for dual antiplatelet therapy.    Patient's clinical presentation to the cardiac cath lab: unstable angina.     Patient is at risk for acute myocardial infarction.   Patient appears to be managing well.                 Pre-sedation assessment  ASA 3   Mallampati class: III - soft palate, base of uvula visible.

## 2022-03-04 NOTE — POST-PROCEDURE NOTE
Cath lab post procedure note:     Procedure: Coronary angiogram with PCI   Indication: positive CTA coronaries   Sedation: Fentanyl and Versed   Access site: left radial   Hemostasis achieved using: radial band   EBL: 10cc   Findings :   80% stenosis mid-LAD s/p PCI with BISI  70% stenosis in the ramus  Nonobstructive disease throughout the remainder of the epicardial coronaries.     Complications: None.   Plan and Instructions: dual antiplatelet therapy with aspirin and plavix, loaded in the cath lab       Full report to follow.     Amie Santana MD

## 2022-03-04 NOTE — DISCHARGE INSTRUCTIONS
· Post cardiac catheterization instructions:  · No driving, operating heavy machinery, making critical decisions or activities that require balance for 24 hours.  This is because of sedation you received for your procedure.  · On day of discharge, limit activities.  · No heavy lifting greater than 10 lbs for the next 2 days after procedure.    · Remove dressing next day. Keep site clean and dry.  · May shower next day of procedure. Do not submerge catherization site in pool or tub baths for 5 days  · Call if  swelling, bleeding, fever or drainage from catheterization site        Medications: Take medications as directed.  Call your physician in any questions or concerns    New medication: Plavix 75 mg daily  Take medication daily. Do not stop unless directed by cardiologist    Follow up with Dr. Caicedo   Follow up in 7 to 10 days

## 2022-03-05 VITALS
TEMPERATURE: 97.8 F | DIASTOLIC BLOOD PRESSURE: 81 MMHG | BODY MASS INDEX: 26.22 KG/M2 | WEIGHT: 173 LBS | RESPIRATION RATE: 15 BRPM | OXYGEN SATURATION: 96 % | HEIGHT: 68 IN | SYSTOLIC BLOOD PRESSURE: 134 MMHG | HEART RATE: 67 BPM

## 2022-03-05 LAB
ANION GAP SERPL CALC-SCNC: 10 MEQ/L (ref 3–15)
BUN SERPL-MCNC: 16 MG/DL (ref 8–20)
CALCIUM SERPL-MCNC: 9.3 MG/DL (ref 8.9–10.3)
CHLORIDE SERPL-SCNC: 105 MEQ/L (ref 98–109)
CO2 SERPL-SCNC: 23 MEQ/L (ref 22–32)
CREAT SERPL-MCNC: 0.9 MG/DL (ref 0.8–1.3)
ERYTHROCYTE [DISTWIDTH] IN BLOOD BY AUTOMATED COUNT: 13.3 % (ref 11.6–14.4)
GFR SERPL CREATININE-BSD FRML MDRD: >60 ML/MIN/1.73M*2
GLUCOSE SERPL-MCNC: 97 MG/DL (ref 70–99)
HCT VFR BLDCO AUTO: 47.4 % (ref 40.1–51)
HGB BLD-MCNC: 15.6 G/DL (ref 13.7–17.5)
MCH RBC QN AUTO: 27.3 PG (ref 28–33.2)
MCHC RBC AUTO-ENTMCNC: 32.9 G/DL (ref 32.2–36.5)
MCV RBC AUTO: 82.9 FL (ref 83–98)
PDW BLD AUTO: 10.7 FL (ref 9.4–12.4)
PLATELET # BLD AUTO: 212 K/UL (ref 150–350)
POTASSIUM SERPL-SCNC: 4.5 MEQ/L (ref 3.6–5.1)
RBC # BLD AUTO: 5.72 M/UL (ref 4.5–5.8)
SODIUM SERPL-SCNC: 138 MEQ/L (ref 136–144)
WBC # BLD AUTO: 4.96 K/UL (ref 3.8–10.5)

## 2022-03-05 PROCEDURE — 36415 COLL VENOUS BLD VENIPUNCTURE: CPT | Performed by: NURSE PRACTITIONER

## 2022-03-05 PROCEDURE — 63700000 HC SELF-ADMINISTRABLE DRUG: Performed by: NURSE PRACTITIONER

## 2022-03-05 PROCEDURE — 80048 BASIC METABOLIC PNL TOTAL CA: CPT | Performed by: NURSE PRACTITIONER

## 2022-03-05 PROCEDURE — 99214 OFFICE O/P EST MOD 30 MIN: CPT | Performed by: INTERNAL MEDICINE

## 2022-03-05 PROCEDURE — 85027 COMPLETE CBC AUTOMATED: CPT | Performed by: NURSE PRACTITIONER

## 2022-03-05 RX ADMIN — ASPIRIN 81 MG: 81 TABLET, CHEWABLE ORAL at 08:25

## 2022-03-05 RX ADMIN — CLOPIDOGREL BISULFATE 75 MG: 75 TABLET ORAL at 08:25

## 2022-03-05 RX ADMIN — ROSUVASTATIN CALCIUM 20 MG: 20 TABLET, FILM COATED ORAL at 08:25

## 2022-03-05 NOTE — PLAN OF CARE
Problem: Adult Inpatient Plan of Care  Goal: Readiness for Transition of Care  Intervention: Mutually Develop Transition Plan  Flowsheets (Taken 3/5/2022 0830)  Equipment Needed After Discharge: none  Discharge Coordination/Progress: 3/5/22 831: SW completed assessment with patient via phone. Patient currently admitted for cardiac catherization. SW confirmed demographics/ insurance and pharmacy. Patient currently resides in a [place] community house with his wife. Patient stated there are two steps to enter and no steps inside house. Patient stated he does not use any assistive devices or has any at home. Patient stated he does not work. Patient stated he drives and feels comfortable driving to and from appointments. Patient stated he is fully independent. Patient does not have a POA/LW. Patient stated he has never had HC services or been to a SNF. Patient stated he has children approximately 10 minutes away. Patient stated he is COVID vaccinated. ANNA [place]  Assistive Device/Animal Currently Used at Home: none  Anticipated Changes Related to Illness: none  Transportation Concerns: car, none  Readmission Within the Last 30 Days: no previous admission in last 30 days  Patient/Family Anticipated Services at Transition: none  Patient/Family Anticipates Transition to: home with family  Transportation Anticipated: family or friend will provide  Concerns to be Addressed: no discharge needs identified

## 2022-03-05 NOTE — PROGRESS NOTES
Inpatient Cardiology   Daily Progress Note       Overview: 62-year-old man underwent PCI of the LAD   Assessment/Plan   Mixed hyperlipidemia  Assessment & Plan  High intensity statin    Premature ventricular contraction  Assessment & Plan  HOLTER Monitor  1. The patient was monitored for 24 hours.  2. The predominant rhythm was sinus rhythm.  3. The average heart rate was 82 bpm.  4. The minimum heart rate was 63 bpm.  5. The maximum heart rate was 127 bpm.  6. There was 1 SVE tachycardia which lasted 4 beats 1.1 secs with an average heart rate of 159 bpm at 2:29 pm on 11/17/2021.  7. There were 29969 premature ventricular beats. There were 934 ventricular bigeminy cycles. There were 10 ventricular couplets.  8. Patients' diary was not submitted.  11/19/2021      * Coronary artery disease involving native coronary artery of native heart without angina pectoris  Assessment & Plan  Underwent PCI of the mid LAD  Doing well this morning  I emphasized the importance of strict compliance with dual antiplatelet therapy to reduce the risk of heart attacks  Can be discharged home today            Subjective   Feels well, no complaints   Current Medications:    Scheduled:  • aspirin  81 mg oral Daily   • clopidogreL  75 mg oral Daily   • rosuvastatin  20 mg oral Daily       Infusions:      PRN:  •  acetaminophen  •  atropine  •  glucose **OR** dextrose **OR** glucagon **OR** dextrose in water  •  nitroglycerin   Objective   Vital signs in last 24 hours:  Temp:  [36.3 °C (97.3 °F)-36.7 °C (98.1 °F)] 36.4 °C (97.5 °F)  Heart Rate:  [60-84] 70  Resp:  [18-51] 18  BP: (122-175)/(71-83) 137/75    Wt Readings from Last 3 Encounters:   03/04/22 78.5 kg (173 lb)   02/16/22 77.1 kg (170 lb)   12/30/21 75.8 kg (167 lb)       Physical Exam:  General Appearance:  Alert, no distress   Head:  Normocephalic, without obvious abnormality, atraumatic   Eyes:  Conjunctiva/corneas clear, EOM's intact   Neck: No thyroid enlargement. JVD<8.  No bruits     Lungs:   Clear to auscultation bilaterally   Heart:  Regular rhythm,no murmur heard   Abdomen:   Soft, non-tender, no masses, no organomegaly   Vascular: Pulses 2+ and symmetric all extremities, no carotid bruit    Musculoskeletal:  Skin: No injury or deformity  Skin color, texture, turgor normal, no rashes or lesions, no cyanosis    Extremities: No edema   Behavior/Emotional: Appropriate, cooperative   Neurologic:                    Awake, Alert and Oriented x3,No focal deficit        Labs and Data:      Lab Results   Component Value Date    WBC 4.5 02/09/2022    HGB 14.9 02/09/2022    HCT 45.5 02/09/2022     02/09/2022    CHOL 155 02/09/2022    TRIG 96 02/09/2022    HDL 60 02/09/2022    LDLCALC 77 02/09/2022    ALT 19 02/09/2022    AST 19 02/09/2022     02/09/2022    K 4.1 02/09/2022     02/09/2022    CREATININE 0.87 02/09/2022    BUN 21 02/09/2022    CO2 25 02/09/2022       Radiology:  N/A    Telemetry  sinus rhythm          Sukhjinder Lara MD  3/5/2022

## 2022-03-07 PROBLEM — Z98.61 POST PTCA: Status: ACTIVE | Noted: 2022-03-07

## 2022-03-07 LAB
POCT ACT-LR: >400 SEC (ref 113–149)
POCT TEST: ABNORMAL

## 2022-03-09 ENCOUNTER — OFFICE VISIT (OUTPATIENT)
Dept: CARDIOLOGY | Facility: CLINIC | Age: 62
End: 2022-03-09
Payer: COMMERCIAL

## 2022-03-09 VITALS — HEART RATE: 72 BPM | DIASTOLIC BLOOD PRESSURE: 68 MMHG | SYSTOLIC BLOOD PRESSURE: 108 MMHG | OXYGEN SATURATION: 98 %

## 2022-03-09 DIAGNOSIS — C43.31 MALIGNANT MELANOMA OF NOSE (CMS/HCC): ICD-10-CM

## 2022-03-09 DIAGNOSIS — Z98.61 POST PTCA: Primary | ICD-10-CM

## 2022-03-09 DIAGNOSIS — C18.9 MALIGNANT NEOPLASM OF COLON, UNSPECIFIED PART OF COLON (CMS/HCC): ICD-10-CM

## 2022-03-09 DIAGNOSIS — E78.2 MIXED HYPERLIPIDEMIA: ICD-10-CM

## 2022-03-09 PROCEDURE — 93000 ELECTROCARDIOGRAM COMPLETE: CPT | Performed by: INTERNAL MEDICINE

## 2022-03-09 PROCEDURE — 99214 OFFICE O/P EST MOD 30 MIN: CPT | Performed by: INTERNAL MEDICINE

## 2022-03-09 RX ORDER — CLOPIDOGREL BISULFATE 75 MG/1
75 TABLET ORAL DAILY
Qty: 90 TABLET | Refills: 6 | Status: SHIPPED | OUTPATIENT
Start: 2022-03-09 | End: 2023-03-24

## 2022-03-09 RX ORDER — ROSUVASTATIN CALCIUM 20 MG/1
20 TABLET, COATED ORAL DAILY
Qty: 90 TABLET | Refills: 6 | Status: SHIPPED | OUTPATIENT
Start: 2022-03-09 | End: 2022-09-14 | Stop reason: SDUPTHER

## 2022-03-09 NOTE — ASSESSMENT & PLAN NOTE
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

## 2022-03-09 NOTE — ASSESSMENT & PLAN NOTE
Chemotherapy radiation age 48 for routine colonoscopy if possible would not interrupt dual antiplatelet until March 2023 after recent coronary stent

## 2022-03-09 NOTE — ASSESSMENT & PLAN NOTE
As part of work-up for outflow tract PVCs he had stress echocardiogram in coronary calcium score he has normal LV systolic function calcium score over 1600 CTA suggested mid LAD lesion underwent cardiac catheterization had successful stent of mid LAD lesion March 2022 Synergy stent residual lesion and small ramus branch no intervention required  Dual antiplatelet for 1 year

## 2022-05-24 ENCOUNTER — TELEPHONE (OUTPATIENT)
Dept: FAMILY MEDICINE | Facility: CLINIC | Age: 62
End: 2022-05-24
Payer: COMMERCIAL

## 2022-05-24 NOTE — TELEPHONE ENCOUNTER
Patient tested positive for Covid today. Only symptom he has is a cough.  States he feels fine  Patient advised to drink lots of fluids and rest. Patient will call for an appointment if he needs.

## 2022-09-07 LAB
ALBUMIN SERPL-MCNC: 4.5 G/DL (ref 3.8–4.8)
ALBUMIN/GLOB SERPL: 2 {RATIO} (ref 1.2–2.2)
ALP SERPL-CCNC: 78 IU/L (ref 44–121)
ALT SERPL-CCNC: 22 IU/L (ref 0–44)
AST SERPL-CCNC: 23 IU/L (ref 0–40)
BASOPHILS # BLD AUTO: 0 X10E3/UL (ref 0–0.2)
BASOPHILS NFR BLD AUTO: 1 %
BILIRUB SERPL-MCNC: 0.5 MG/DL (ref 0–1.2)
BUN SERPL-MCNC: 20 MG/DL (ref 8–27)
BUN/CREAT SERPL: 22 (ref 10–24)
CALCIUM SERPL-MCNC: 9.8 MG/DL (ref 8.6–10.2)
CHLORIDE SERPL-SCNC: 101 MMOL/L (ref 96–106)
CHOLEST SERPL-MCNC: 177 MG/DL (ref 100–199)
CO2 SERPL-SCNC: 25 MMOL/L (ref 20–29)
CREAT SERPL-MCNC: 0.93 MG/DL (ref 0.76–1.27)
EGFRCR-CYS SERPLBLD CKD-EPI 2021: 92 ML/MIN/1.73
EOSINOPHIL # BLD AUTO: 0.1 X10E3/UL (ref 0–0.4)
EOSINOPHIL NFR BLD AUTO: 2 %
ERYTHROCYTE [DISTWIDTH] IN BLOOD BY AUTOMATED COUNT: 13.2 % (ref 11.6–15.4)
GLOBULIN SER CALC-MCNC: 2.2 G/DL (ref 1.5–4.5)
GLUCOSE SERPL-MCNC: 104 MG/DL (ref 65–99)
HCT VFR BLD AUTO: 43.6 % (ref 37.5–51)
HDLC SERPL-MCNC: 58 MG/DL
HGB BLD-MCNC: 15.2 G/DL (ref 13–17.7)
IMM GRANULOCYTES # BLD AUTO: 0 X10E3/UL (ref 0–0.1)
IMM GRANULOCYTES NFR BLD AUTO: 0 %
LDLC SERPL CALC-MCNC: 97 MG/DL (ref 0–99)
LYMPHOCYTES # BLD AUTO: 0.9 X10E3/UL (ref 0.7–3.1)
LYMPHOCYTES NFR BLD AUTO: 23 %
MCH RBC QN AUTO: 28.2 PG (ref 26.6–33)
MCHC RBC AUTO-ENTMCNC: 34.9 G/DL (ref 31.5–35.7)
MCV RBC AUTO: 81 FL (ref 79–97)
MONOCYTES # BLD AUTO: 0.4 X10E3/UL (ref 0.1–0.9)
MONOCYTES NFR BLD AUTO: 11 %
NEUTROPHILS # BLD AUTO: 2.5 X10E3/UL (ref 1.4–7)
NEUTROPHILS NFR BLD AUTO: 63 %
PLATELET # BLD AUTO: 215 X10E3/UL (ref 150–450)
POTASSIUM SERPL-SCNC: 4.9 MMOL/L (ref 3.5–5.2)
PROT SERPL-MCNC: 6.7 G/DL (ref 6–8.5)
RBC # BLD AUTO: 5.39 X10E6/UL (ref 4.14–5.8)
SODIUM SERPL-SCNC: 140 MMOL/L (ref 134–144)
TRIGL SERPL-MCNC: 122 MG/DL (ref 0–149)
VLDLC SERPL CALC-MCNC: 22 MG/DL (ref 5–40)
WBC # BLD AUTO: 4 X10E3/UL (ref 3.4–10.8)

## 2022-09-07 NOTE — PROGRESS NOTES
Cardiology Consult/New Patient    Alexander Duron MD          Dane Beckwith is a 62 y.o. male identifies as who presents with     He returns to review his lab work  He has a hx of coronary disease with intervention;  He had catheterization after CTA that showed mid LAD lesion  Underwent PTCA of the mid LAD March 2022 with Synergy stent  He has residual disease in a small ramus vessel no intervention was performed  He has a history of history of   outflow tract PVCs his overall calcium score 1600  He is treated for mixed hyperlipidemia  Melanoma resected from his nose 2021  Cancer of the large intestine at age 48 received chemotherapy and radiation  He has no cardiovascular complaints  He complains of increased bruising most likely from the dual antiplatelet        Lab work September 2022  CBC normal  Glucose 104 creatinine 0.93 potassium 4.9  Cholesterol LDL not at goal 97 total cholesterol 177 HDL 58                Patient Active Problem List    Diagnosis Date Noted    Post PTCA 03/07/2022    Coronary artery disease involving native coronary artery of native heart without angina pectoris 12/30/2021    Mixed hyperlipidemia 11/17/2021    Family history of coronary artery disease 11/17/2021    Malignant melanoma of nose (CMS/HCC) 10/06/2021    Melanoma of back (CMS/HCC) 10/06/2021    Premature ventricular contraction 10/06/2021    Anemia 11/13/2017    Malignant neoplasm of large intestine (CMS/HCC) 11/13/2017       Medical History:   Past Medical History:   Diagnosis Date    Allergic     Colon cancer (CMS/HCC)     + radiation, chemo and surgery     Coronary artery disease     Malignant melanoma of nose (CMS/HCC) 10/6/2021    Melanoma (CMS/HCC)     Melanoma of back (CMS/HCC) 10/6/2021    Premature ventricular contraction 10/6/2021       Surgical History:   Past Surgical History:   Procedure Laterality Date    COLECTOMY      SKIN BIOPSY      nose for melanoma       Allergies: Amoxicillin trihydrate,  Penicillins, and Potassium clavulanate    Current Outpatient Medications   Medication Sig Dispense Refill    ascorbic acid (VITAMIN C ORAL) Take by mouth.      aspirin 81 mg chewable tablet Take 81 mg by mouth daily.      cholecalciferol, vitamin D3, (VITAMIN D3 ORAL) Take by mouth.      clopidogreL (PLAVIX) 75 mg tablet Take 1 tablet (75 mg total) by mouth daily. 90 tablet 6    rosuvastatin (CRESTOR) 20 mg tablet Take 1 tablet (20 mg total) by mouth See admin instr. 40 mg mon wed fri 20 the rest 129 tablet 6     No current facility-administered medications for this visit.       Social History:   Social History     Socioeconomic History    Marital status:      Spouse name: None    Number of children: None    Years of education: None    Highest education level: None   Tobacco Use    Smoking status: Never Smoker    Smokeless tobacco: Never Used   Vaping Use    Vaping Use: Never used   Substance and Sexual Activity    Alcohol use: Yes     Comment: 6-7/wk    Drug use: No    Sexual activity: Yes     Partners: Female   Social History Narrative    Do you wear your seatbelt? Yes    Do you have smoke detector in your home? Yes    Do you have a carbon monoxide detector in your home? 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        Family History:   Family History   Problem Relation Age of Onset    Breast cancer Biological Mother     Colon cancer Biological Father     Heart disease Biological Father     Heart disease Father's Brother     Hyperlipidemia Biological Brother     Hypertension Biological Brother     Hyperlipidemia Biological Brother     Hypertension Biological Brother        Review of Systems   ROS    Objective       Vitals:    09/14/22 0756   BP: 126/72   Pulse: 75   SpO2: 98%       Physical Exam  Constitutional:       General: He is not in acute distress.     Appearance: Normal appearance. He is well-developed. He is not ill-appearing, toxic-appearing  or diaphoretic.      Interventions: He is not intubated.  HENT:      Head: Normocephalic. Not microcephalic. No raccoon eyes, abrasion or contusion.      Nose: Nose normal.   Eyes:      General: No scleral icterus.        Left eye: No discharge.      Conjunctiva/sclera:      Right eye: Right conjunctiva is not injected.      Left eye: Left conjunctiva is not injected. No exudate or hemorrhage.     Pupils: Pupils are equal.   Neck:      Vascular: Normal carotid pulses. No carotid bruit or hepatojugular reflux.   Cardiovascular:      Rate and Rhythm: Normal rate and regular rhythm.      Chest Wall: PMI is not displaced.      Pulses: Normal pulses and intact distal pulses.      Heart sounds: Normal heart sounds. No murmur heard.    No friction rub. No gallop.   Pulmonary:      Effort: Pulmonary effort is normal. No tachypnea, bradypnea or respiratory distress. He is not intubated.      Breath sounds: Normal breath sounds. No stridor. No wheezing or rales.   Chest:      Chest wall: No tenderness.   Abdominal:      General: Bowel sounds are normal. There is no distension.      Palpations: Abdomen is soft.      Tenderness: There is no abdominal tenderness.   Musculoskeletal:         General: No deformity. Normal range of motion.      Cervical back: Normal range of motion and neck supple.   Skin:     Coloration: Skin is not pale.      Findings: No abrasion, bruising, ecchymosis, erythema or rash.   Neurological:      Mental Status: He is alert and oriented to person, place, and time.   Psychiatric:         Mood and Affect: Mood is not anxious or depressed. Affect is not labile, blunt, angry or inappropriate.         Speech: He is communicative. Speech is not slurred.         Behavior: Behavior is not agitated, aggressive, withdrawn or combative.          Labs   Lab Results   Component Value Date    WBC 4.0 09/07/2022    HGB 15.2 09/07/2022    HCT 43.6 09/07/2022     09/07/2022    CHOL 177 09/07/2022    TRIG 122  09/07/2022    HDL 58 09/07/2022    ALT 22 09/07/2022    AST 23 09/07/2022     09/07/2022    K 4.9 09/07/2022     09/07/2022    CREATININE 0.93 09/07/2022    BUN 20 09/07/2022    CO2 25 09/07/2022    PSA 0.5 10/06/2021   [id number]    Imaging    CATH    PTCA MARCH 2022    FINDINGS:  1. 80% mid-LAD stenosis.   2. 80% mid-ramus stenosis.   3. Nonobstructive disease throughout the remainder of the epicardial coronaries.      INTERVENTION:   Successful PCI of  LAD with a  [id number] Synergy BISI was deployed to 12 makenna. It was postdilated with a 3.0x12mm noncompliant balloon to 16 makenna          STRESS    Stress echo dec 2021  freq vpc neg ischemia  Somewhat equivocal stress echocardiogram due to frequent PVCs  No obvious ischemia with stress echocardiogram  The patient completed 12 METS of exercise completed stage IV excellent exercise tolerance  Baseline EKG frequent PVCs with stress exercise and recovery no complex arrhythmia  Baseline echocardiogram normal chamber sizes no regional wall motion abnormalities preserved ejection fraction 55%  No structural valvular disease  Trace mitral regurgitation  No obvious regional wall motion abnormalities all walls appear to be hyperdynamic with exercise        CAT    COR CIARRA SCORE 1600 12/21        COR CTA FEB 2022      ELECTROPHYSIOLOGY    HOLTER NOV2021    The patient was monitored for 24 hours.  2. The predominant rhythm was sinus rhythm.  3. The average heart rate was 82 bpm.  4. The minimum heart rate was 63 bpm.  5. The maximum heart rate was 127 bpm.  6. There was 1 SVE tachycardia which lasted 4 beats 1.1 secs with an average heart rate of 159 bpm at 2:29 pm on 11/17/2021.  7. There were 33744  15% vpcpremature ventricular beats. There were 934 ventricular bigeminy cycles. There were 10 ventricular couplets          ECG       sept 2022 normal sinus rhythm PVCs unchanged 2022 Nov 2021      Assessment/Plan     Post PTCA  I met him for alcohol try to see  several asymptomatic work-up with coronary CT angiogram revealed critical coronary artery disease with accompanying coronary calcium score 1600 he underwent stent to the mid LAD March 2022, Synergy stent he has normal LV systolic function remains on dual antiplatelet.  Some bruising.  Visit in the next visit    Premature ventricular contraction  He has been  Outflow tract PVCs asymptomatic with preserved ejection fraction last echocardiogram December 2021 EF 55% indication for therapy i.e. pharmaceutical or ablation would be symptoms or drop in LV systolic function.  None at this time.  Repeat echo next visit    Mixed hyperlipidemia  LDL cholesterol 97 increase rosuvastatin 40 mg Monday Wednesday Friday 20 the rest lab work 6 months    Malignant melanoma of nose (CMS/HCC)  Resected 2021    Malignant neoplasm of large intestine (CMS/HCC)  Age 48 chemotherapy radiation surgery              Status post stent in the mid LAD March 2022  Residual lesion small ramus branch no intervention required  Outflow tract PVCs normal LV systolic function no therapy  Today I increased his rosuvastatin to 40 mg Monday Wednesday Friday  I will recheck lab work in 6 months with echocardiogram  I am not treating his outflow PVCs indication for treatment would be symptoms or drop in LV systolic function repeat echo at next visit  We will stop Plavix at his next visit continue aspirin          This letter was generated using speech recognition software.  Please excuse any typographical errors.      Jarrett Caicedo MD  9/14/2022

## 2022-09-14 ENCOUNTER — OFFICE VISIT (OUTPATIENT)
Dept: CARDIOLOGY | Facility: CLINIC | Age: 62
End: 2022-09-14
Payer: COMMERCIAL

## 2022-09-14 VITALS — SYSTOLIC BLOOD PRESSURE: 126 MMHG | HEART RATE: 75 BPM | OXYGEN SATURATION: 98 % | DIASTOLIC BLOOD PRESSURE: 72 MMHG

## 2022-09-14 DIAGNOSIS — C43.31 MALIGNANT MELANOMA OF NOSE (CMS/HCC): ICD-10-CM

## 2022-09-14 DIAGNOSIS — Z98.61 POST PTCA: Primary | ICD-10-CM

## 2022-09-14 DIAGNOSIS — C18.9 MALIGNANT NEOPLASM OF COLON, UNSPECIFIED PART OF COLON (CMS/HCC): ICD-10-CM

## 2022-09-14 DIAGNOSIS — I49.3 PREMATURE VENTRICULAR CONTRACTION: ICD-10-CM

## 2022-09-14 PROCEDURE — 99214 OFFICE O/P EST MOD 30 MIN: CPT | Performed by: INTERNAL MEDICINE

## 2022-09-14 PROCEDURE — 93000 ELECTROCARDIOGRAM COMPLETE: CPT | Performed by: INTERNAL MEDICINE

## 2022-09-14 RX ORDER — ROSUVASTATIN CALCIUM 20 MG/1
20 TABLET, COATED ORAL SEE ADMIN INSTRUCTIONS
Qty: 129 TABLET | Refills: 6 | Status: SHIPPED | OUTPATIENT
Start: 2022-09-14 | End: 2023-02-20 | Stop reason: SDUPTHER

## 2022-09-14 NOTE — ASSESSMENT & PLAN NOTE
LDL cholesterol 97 increase rosuvastatin 40 mg Monday Wednesday Friday 20 the rest lab work 6 months

## 2022-09-14 NOTE — ASSESSMENT & PLAN NOTE
I met him for alcohol try to see several asymptomatic work-up with coronary CT angiogram revealed critical coronary artery disease with accompanying coronary calcium score 1600 he underwent stent to the mid LAD March 2022, Synergy stent he has normal LV systolic function remains on dual antiplatelet.  Some bruising.  Visit in the next visit

## 2022-09-14 NOTE — ASSESSMENT & PLAN NOTE
He has been  Outflow tract PVCs asymptomatic with preserved ejection fraction last echocardiogram December 2021 EF 55% indication for therapy i.e. pharmaceutical or ablation would be symptoms or drop in LV systolic function.  None at this time.  Repeat echo next visit

## 2022-10-10 ENCOUNTER — OFFICE VISIT (OUTPATIENT)
Dept: FAMILY MEDICINE | Facility: CLINIC | Age: 62
End: 2022-10-10
Payer: COMMERCIAL

## 2022-10-10 VITALS
TEMPERATURE: 98.3 F | HEART RATE: 74 BPM | HEIGHT: 68 IN | BODY MASS INDEX: 26.61 KG/M2 | WEIGHT: 175.6 LBS | OXYGEN SATURATION: 97 % | DIASTOLIC BLOOD PRESSURE: 80 MMHG | SYSTOLIC BLOOD PRESSURE: 124 MMHG

## 2022-10-10 DIAGNOSIS — Z12.5 SCREENING FOR PROSTATE CANCER: ICD-10-CM

## 2022-10-10 DIAGNOSIS — Z00.00 ENCOUNTER FOR GENERAL ADULT MEDICAL EXAMINATION WITHOUT ABNORMAL FINDINGS: Primary | ICD-10-CM

## 2022-10-10 PROCEDURE — 3008F BODY MASS INDEX DOCD: CPT | Performed by: FAMILY MEDICINE

## 2022-10-10 PROCEDURE — 90471 IMMUNIZATION ADMIN: CPT | Performed by: FAMILY MEDICINE

## 2022-10-10 PROCEDURE — 90686 IIV4 VACC NO PRSV 0.5 ML IM: CPT | Performed by: FAMILY MEDICINE

## 2022-10-10 PROCEDURE — 99396 PREV VISIT EST AGE 40-64: CPT | Mod: 25 | Performed by: FAMILY MEDICINE

## 2022-10-10 ASSESSMENT — ENCOUNTER SYMPTOMS
DIARRHEA: 0
ARTHRALGIAS: 0
ABDOMINAL PAIN: 0
SORE THROAT: 0
CHILLS: 0
BLOOD IN STOOL: 0
BACK PAIN: 0
RHINORRHEA: 0
CONSTIPATION: 0
ADENOPATHY: 0
SHORTNESS OF BREATH: 0
WEAKNESS: 0
DIZZINESS: 0
PALPITATIONS: 0
NAUSEA: 0
FEVER: 0
VOMITING: 0
NUMBNESS: 0
DYSURIA: 0
COUGH: 0
FREQUENCY: 0

## 2022-10-10 NOTE — PROGRESS NOT[place] [address]  [phone]       Reason for visit:   Chief Complaint   Patient presents with    Annual Exam      HPI   Dane Beckwith is a 62 y.o. male who presents for his CPX   Medical Updates Yes Had a stent placed 3/22, on Plavix. Sees Cardiology, appointment last month.    Personal Updates No     Diet - Average  Exercise - Walking, Weights - daily    Smoke No   Alcohol Yes 7-10/wk  Drugs No     Lives with Wife  Work Retired - Financial Exec  Colonoscopy Due Yes Will be due this month but will hold until off Plavix in 6 months  CT Lung Due No     Hep A Due No   TDAP Due No   Flu Due Yes   Shingles Due No   COVID Vaccination Due No   Lipids Due Yes   Hep C Due No     Advanced Directives Complete No     Specialists: Cardiology, Dermatology, GI   Past Medical History:   Diagnosis Date    Colon cancer (CMS/HCC)     + radiation, chemo and surgery     Coronary artery disease     Malignant melanoma of nose (CMS/[facility]) 10/06/2021    Melanoma of back (CMS/[facility]) 10/06/2021    Premature ventricular contraction 10/06/2021     Past Surgical History:   Procedure Laterality Date    CARDIAC SURGERY      COLECTOMY      SKIN BIOPSY      nose for melanoma     Social History     Socioeconomic History    Marital status:      Spouse name: Not on file    Number of children: Not on file    Years of education: Not on file    Highest education level: Not on file   Occupational History    Not on file   Tobacco Use    Smoking status: Never    Smokeless tobacco: Never   Vaping Use    Vaping Use: Never used   Substance and Sexual Activity    Alcohol use: Yes     Comment: 7-10/wk    Drug use: No    Sexual activity: Yes     Partners: Female   Other Topics Concern    Not on file   Social History Narrative    Do you wear your seatbelt? Yes    Do you have smoke detector in your home? Yes    Do you have a carbon monoxide detector in your home?  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      Social Determinants of Health     Financial Resource Strain: Not on file   Food Insecurity: Not on file   Transportation Needs: Not on file   Physical Activity: Not on file   Stress: Not on file   Social Connections: Not on file   Intimate Partner Violence: Not on file   Housing Stability: Not on file     Family History   Problem Relation Age of Onset    Breast cancer Biological Mother     Colon cancer Biological Father     Heart disease Biological Father     Heart disease Father's Brother     Hyperlipidemia Biological Brother     Hypertension Biological Brother     Hyperlipidemia Biological Brother     Hypertension Biological Brother      Amoxicillin trihydrate, Penicillins, and Potassium clavulanate  Current Outpatient Medications   Medication Sig Dispense Refill    ascorbic acid (VITAMIN C ORAL) Take by mouth.      aspirin 81 mg chewable tablet Take 81 mg by mouth daily.      cholecalciferol, vitamin D3, (VITAMIN D3 ORAL) Take by mouth.      clopidogreL (PLAVIX) 75 mg tablet Take 1 tablet (75 mg total) by mouth daily. 90 tablet 6    rosuvastatin (CRESTOR) 20 mg tablet Take 1 tablet (20 mg total) by mouth See admin instr. 40 mg mon wed fri 20 the rest 129 tablet 6     No current facility-administered medications for this visit.       Review of Systems   Constitutional: Negative for chills and fever.   HENT: Negative for congestion, hearing loss, rhinorrhea and sore throat.    Eyes: Negative for visual disturbance.   Respiratory: Negative for cough and shortness of breath.    Cardiovascular: Negative for chest pain and palpitations.   Gastrointestinal: Negative for abdominal pain, blood in stool, constipation, diarrhea, nausea and vomiting.   Genitourinary: Negative for dysuria, frequency and testicular pain.        Nocturia 1/night   Musculoskeletal: Negative for arthralgias and back pain.   Skin: Negative for rash.  "  Allergic/Immunologic: Negative for environmental allergies and food allergies.   Neurological: Negative for dizziness, weakness and numbness.   Hematological: Negative for adenopathy.     Objective   Vitals:    10/10/22 0926 10/10/22 0947   BP: (!) 153/80 124/80   BP Location: Left upper arm Right upper arm   Patient Position: Sitting Sitting   Pulse: 74    Temp: 36.8 °C (98.3 °F)    TempSrc: Oral    SpO2: 97%    Weight: 79.7 kg (175 lb 9.6 oz)    Height: 1.727 m (5' 8\")        Physical Exam  Vitals and nursing note reviewed.   Constitutional:       Appearance: Normal appearance. He is well-developed and well-nourished.   HENT:      Head: Normocephalic and atraumatic.      Right Ear: Tympanic membrane and ear canal normal.      Left Ear: Tympanic membrane and ear canal normal.      Nose: Nose normal.      Mouth/Throat:      Mouth: Oropharynx is clear and moist and mucous membranes are normal.   Eyes:      Conjunctiva/sclera: Conjunctivae normal.      Pupils: Pupils are equal, round, and reactive to light.   Neck:      Thyroid: No thyroid mass or thyromegaly.   Cardiovascular:      Rate and Rhythm: Normal rate and regular rhythm.      Pulses: Normal pulses.           Radial pulses are 2+ on the right side and 2+ on the left side.      Heart sounds: S1 normal and S2 normal. No murmur heard.    No friction rub. No gallop.   Pulmonary:      Effort: Pulmonary effort is normal.      Breath sounds: Normal breath sounds. No wheezing, rhonchi or rales.   Abdominal:      General: Bowel sounds are normal.      Palpations: Abdomen is soft.      Tenderness: There is no abdominal tenderness. There is no guarding or rebound.   Musculoskeletal:         General: Normal range of motion.   Lymphadenopathy:      Cervical: No cervical adenopathy.   Neurological:      Mental Status: He is alert and oriented to person, place, and time.      Cranial Nerves: No cranial nerve deficit.      Motor: Motor strength is normal.   Psychiatric:    "      Mood and Affect: Mood and affect normal.         Speech: Speech normal.         Behavior: Behavior normal.         Cognition and Memory: Cognition and memory normal.         Judgment: Judgment normal.         Procedures    Health Maintenance   Topic Date Due    Pneumococcal (1 - PCV) Never done    Pneumococcal (65 years and older) (1 - PCV) Never done    HIV Screening  Never done    Influenza Vaccine (1) 08/01/2022    Colorectal Cancer Screening  10/25/2022    DTaP, Tdap, and Td Vaccines (2 - Td or Tdap) 08/14/2029    Zoster Vaccine  Completed    Hepatitis C Screening  Completed    COVID-19 Vaccine  Completed    Meningococcal ACWY  Aged Out    HIB Vaccines  Aged Out    IPV Vaccines  Aged Out    HPV Vaccines  Aged Out       Lab Results   Component Value Date    WBC 4.0 09/07/2022    HGB 15.2 09/07/2022    HCT 43.6 09/07/2022     09/07/2022    CHOL 177 09/07/2022    TRIG 122 09/07/2022    HDL 58 09/07/2022    ALT 22 09/07/2022    AST 23 09/07/2022     09/07/2022    K 4.9 09/07/2022     09/07/2022    CREATININE 0.93 09/07/2022    BUN 20 09/07/2022    CO2 25 09/07/2022    PSA 0.5 10/06/2021         Assessment   Problem List Items Addressed This Visit        Other    Encounter for general adult medical examination without abnormal findings - Primary     Adult Male  Complaints - Had stent placed 3/22 - doing well, just saw Cardiology  Diet - Average  Activity - Active with walking  Tobacco Use - None  EtOH Use - 7-10/week  Drug Use - None  Sexual Activity -   PMH - Colon Cancer, Melanoma of Nose and Back  FHX - M Breast; D colon CAD  Labs Due - PSA  Immunizations Due - Flu  Preventative Care Due - Colonoscopy in Winter  Refused - None  Follow up - 1 y         Relevant Orders    Influenza vaccine quadrivalent preservative free 6 month and older IM   Other Visit Diagnoses     Screening for prostate cancer        Relevant Orders    PSA              Alexander Duron MD  10/10/2022

## 2022-10-10 NOTE — ASSESSMENT & PLAN NOTE
Adult Male  Complaints - Had stent placed 3/22 - doing well, just saw Cardiology  Diet - Average  Activity - Active with walking  Tobacco Use - None  EtOH Use - 7-10/week  Drug Use - None  Sexual Activity -   PMH - Colon Cancer, Melanoma of Nose and Back  FHX - M Breast; D colon CAD  Labs Due - PSA  Immunizations Due - Flu  Preventative Care Due - Colonoscopy in Winter  Refused - None  Follow up - 1 y

## 2022-10-10 NOTE — PATIENT INSTRUCTIONS
Diet - Try to limit portion sizes, take out, fast food, processed foods. Alcohol can be a expensive source of calories! If these are current problems for you, pick one area and focus on that first! There is no such thing as a perfect diet. If you are trying to lose weight, it will be difficult to do with foods you do not enjoy.  You may need to try a few different things before finding something.  In general, the diet with the best evidence is the Mediterranean diet. If you have high blood pressure, the DASH diet has good evidence to lower weight and BP.    Exercise - Goal should be 30-40 minutes, 3-4 times a week. If you are currently not exercising, set reachable goals with short term deadlines! The same goes with diet - you are less likely to be successful if you are doing something you don't enjoy.  Weights - even 1-2 pounds! - are great to strengthen bones in old adults.      Preventative Care Due - Colonoscopy in Winter    Labs Due Today - Yes    Shots Due Today - Flu    Follow up - 1 year or sooner if anything comes up. We keep same-day sick appointments available every day for last minute problems.  If it is during the week - call us! Often times we can prevent an ER or UC visit! For certain problems, a telemedicine visit can be a great way to see me without having to come into the office or go to an UC!    If you have any specialists such as a Cardiologist, Gastroenterologist for a chronic problem, make sure you are following up with them as recommended!  Diabetics should be having a yearly eye exam by a Optometrist/Ophthalmologist and a foot exam by a Podiatrist!  Women should see their GYN yearly - though you may not need a pap smear done at each visit.  Those with a family history of skin cancer should see a Dermatologist annually.

## 2023-02-20 DIAGNOSIS — E78.2 MIXED HYPERLIPIDEMIA: Primary | ICD-10-CM

## 2023-02-20 RX ORDER — ROSUVASTATIN CALCIUM 40 MG/1
40 TABLET, COATED ORAL 3 TIMES WEEKLY
Qty: 36 TABLET | Refills: 3 | Status: SHIPPED | OUTPATIENT
Start: 2023-02-20 | End: 2023-02-22 | Stop reason: SDUPTHER

## 2023-02-20 RX ORDER — ROSUVASTATIN CALCIUM 20 MG/1
TABLET, COATED ORAL
Qty: 35 TABLET | Refills: 3 | Status: SHIPPED | OUTPATIENT
Start: 2023-02-20 | End: 2023-02-22 | Stop reason: SDUPTHER

## 2023-02-20 RX ORDER — ROSUVASTATIN CALCIUM 20 MG/1
TABLET, COATED ORAL
Qty: 135 TABLET | Refills: 3 | Status: SHIPPED | OUTPATIENT
Start: 2023-02-20 | End: 2023-02-20 | Stop reason: SDUPTHER

## 2023-02-22 DIAGNOSIS — E78.2 MIXED HYPERLIPIDEMIA: ICD-10-CM

## 2023-02-22 RX ORDER — ROSUVASTATIN CALCIUM 20 MG/1
TABLET, COATED ORAL
Qty: 35 TABLET | Refills: 3 | Status: SHIPPED | OUTPATIENT
Start: 2023-02-22 | End: 2023-03-29

## 2023-02-22 RX ORDER — ROSUVASTATIN CALCIUM 40 MG/1
40 TABLET, COATED ORAL 3 TIMES WEEKLY
Qty: 36 TABLET | Refills: 3 | Status: SHIPPED | OUTPATIENT
Start: 2023-02-22 | End: 2023-03-29 | Stop reason: SDUPTHER

## 2023-03-20 LAB
BASOPHILS # BLD AUTO: 0 X10E3/UL (ref 0–0.2)
BASOPHILS NFR BLD AUTO: 1 %
EOSINOPHIL # BLD AUTO: 0.1 X10E3/UL (ref 0–0.4)
EOSINOPHIL NFR BLD AUTO: 2 %
ERYTHROCYTE [DISTWIDTH] IN BLOOD BY AUTOMATED COUNT: 13.3 % (ref 11.6–15.4)
HCT VFR BLD AUTO: 45.5 % (ref 37.5–51)
HGB BLD-MCNC: 15.4 G/DL (ref 13–17.7)
IMM GRANULOCYTES # BLD AUTO: 0 X10E3/UL (ref 0–0.1)
IMM GRANULOCYTES NFR BLD AUTO: 0 %
LYMPHOCYTES # BLD AUTO: 0.7 X10E3/UL (ref 0.7–3.1)
LYMPHOCYTES NFR BLD AUTO: 18 %
MCH RBC QN AUTO: 27.4 PG (ref 26.6–33)
MCHC RBC AUTO-ENTMCNC: 33.8 G/DL (ref 31.5–35.7)
MCV RBC AUTO: 81 FL (ref 79–97)
MONOCYTES # BLD AUTO: 0.5 X10E3/UL (ref 0.1–0.9)
MONOCYTES NFR BLD AUTO: 11 %
NEUTROPHILS # BLD AUTO: 2.8 X10E3/UL (ref 1.4–7)
NEUTROPHILS NFR BLD AUTO: 68 %
PLATELET # BLD AUTO: 209 X10E3/UL (ref 150–450)
RBC # BLD AUTO: 5.62 X10E6/UL (ref 4.14–5.8)
WBC # BLD AUTO: 4.1 X10E3/UL (ref 3.4–10.8)

## 2023-03-21 LAB
ALBUMIN SERPL-MCNC: 4.4 G/DL (ref 3.8–4.8)
ALBUMIN/GLOB SERPL: 2 {RATIO} (ref 1.2–2.2)
ALP SERPL-CCNC: 88 IU/L (ref 44–121)
ALT SERPL-CCNC: 16 IU/L (ref 0–44)
AST SERPL-CCNC: 15 IU/L (ref 0–40)
BILIRUB SERPL-MCNC: 0.4 MG/DL (ref 0–1.2)
BUN SERPL-MCNC: 22 MG/DL (ref 8–27)
BUN/CREAT SERPL: 25 (ref 10–24)
CALCIUM SERPL-MCNC: 9.2 MG/DL (ref 8.6–10.2)
CHLORIDE SERPL-SCNC: 107 MMOL/L (ref 96–106)
CHOLEST SERPL-MCNC: 141 MG/DL (ref 100–199)
CO2 SERPL-SCNC: 25 MMOL/L (ref 20–29)
CREAT SERPL-MCNC: 0.88 MG/DL (ref 0.76–1.27)
EGFRCR SERPLBLD CKD-EPI 2021: 95 ML/MIN/1.73
GLOBULIN SER CALC-MCNC: 2.2 G/DL (ref 1.5–4.5)
GLUCOSE SERPL-MCNC: 98 MG/DL (ref 70–99)
HDLC SERPL-MCNC: 52 MG/DL
LDLC SERPL CALC-MCNC: 71 MG/DL (ref 0–99)
POTASSIUM SERPL-SCNC: 5 MMOL/L (ref 3.5–5.2)
PROT SERPL-MCNC: 6.6 G/DL (ref 6–8.5)
PSA SERPL-MCNC: 0.4 NG/ML (ref 0–4)
SODIUM SERPL-SCNC: 143 MMOL/L (ref 134–144)
TRIGL SERPL-MCNC: 99 MG/DL (ref 0–149)
VLDLC SERPL CALC-MCNC: 18 MG/DL (ref 5–40)

## 2023-03-21 NOTE — PROGRESS NOTES
gCardiology Consult/New Patient    Alexander Duron MD          Dane Beckwith is a 63 y.o. male identifies as who presents with     He returns for follow-up and echocardiogram  And cardiac evaluation prior to elective colonoscopy with Dr. Robert Frankel  He has coronary disease with intervention diagnosed by CT angiogram  He has residual disease in small ramus branch, medical therapy not intervened upon  Stent placed in his mid LAD March 2022   he has normal LV systolic function echocardiogram performed today March 2023  History of outflow tract PVCs   mixed hyperlipidemia   melanoma of the nose resected 2021  Cancer large intestine   treated with surgery radiation and chemotherapy at age 48        Lab work March 2023  Creatinine 0.88 potassium 5  Cholesterol 140 triglycerides 99 HDL 52 LDL 71  CBC normal                    Patient Active Problem List    Diagnosis Date Noted   • Post PTCA 03/07/2022   • Coronary artery disease involving native coronary artery of native heart without angina pectoris 12/30/2021   • Mixed hyperlipidemia 11/17/2021   • Family history of coronary artery disease 11/17/2021   • Malignant melanoma of nose (CMS/HCC) 10/06/2021   • Melanoma of back (CMS/HCC) 10/06/2021   • Premature ventricular contraction 10/06/2021   • Anemia 11/13/2017   • Malignant neoplasm of large intestine (CMS/HCC) 11/13/2017       Medical History:   Past Medical History:   Diagnosis Date   • Colon cancer (CMS/HCC)     + radiation, chemo and surgery    • Coronary artery disease    • Malignant melanoma of nose (CMS/HCC) 10/06/2021   • Melanoma of back (CMS/HCC) 10/06/2021   • Premature ventricular contraction 10/06/2021       Surgical History:   Past Surgical History:   Procedure Laterality Date   • CARDIAC SURGERY     • COLECTOMY     • SKIN BIOPSY      nose for melanoma       Allergies: Amoxicillin trihydrate, Penicillins, and Potassium clavulanate    Current Outpatient Medications   Medication Sig Dispense Refill   •  ascorbic acid (VITAMIN C ORAL) Take by mouth.     • aspirin 81 mg chewable tablet Take 81 mg by mouth daily.     • cholecalciferol, vitamin D3, (VITAMIN D3 ORAL) Take 1 capsule by mouth once.     • rosuvastatin (CRESTOR) 40 mg tablet Take 1 tablet (40 mg total) by mouth daily. 90 tablet 1     No current facility-administered medications for this visit.       Social History:   Social History     Socioeconomic History   • Marital status:      Spouse name: None   • Number of children: None   • Years of education: None   • Highest education level: None   Tobacco Use   • Smoking status: Never   • Smokeless tobacco: Never   Vaping Use   • Vaping status: Never Used   Substance and Sexual Activity   • Alcohol use: Yes     Alcohol/week: 8.0 standard drinks of alcohol     Types: 8 Cans of beer per week     Comment: 6-8/wk   • Drug use: No   • Sexual activity: Yes     Partners: Female   Social History Narrative    Do you wear your seatbelt? Yes    Do you have smoke detector in your home? Yes    Do you have a carbon monoxide detector in your home? 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        Family History:   Family History   Problem Relation Age of Onset   • Breast cancer Biological Mother    • Colon cancer Biological Father    • Heart disease Biological Father    • Heart disease Father's Brother    • Hyperlipidemia Biological Brother    • Hypertension Biological Brother    • Hyperlipidemia Biological Brother    • Hypertension Biological Brother        Review of Systems   ROS    Objective       Vitals:    03/29/23 1058   BP: 140/80   Pulse: 83   SpO2: 98%       Physical Exam  Constitutional:       General: He is not in acute distress.     Appearance: Normal appearance. He is well-developed. He is not ill-appearing, toxic-appearing or diaphoretic.      Interventions: He is not intubated.  HENT:      Head: Normocephalic. Not microcephalic. No raccoon eyes, abrasion or contusion.       Nose: Nose normal.   Eyes:      General: No scleral icterus.        Left eye: No discharge.      Conjunctiva/sclera:      Right eye: Right conjunctiva is not injected.      Left eye: Left conjunctiva is not injected. No exudate or hemorrhage.     Pupils: Pupils are equal.   Neck:      Vascular: Normal carotid pulses. No carotid bruit or hepatojugular reflux.   Cardiovascular:      Rate and Rhythm: Normal rate and regular rhythm.      Chest Wall: PMI is not displaced.      Pulses: Normal pulses and intact distal pulses.      Heart sounds: Normal heart sounds. No murmur heard.     No friction rub. No gallop.   Pulmonary:      Effort: Pulmonary effort is normal. No tachypnea, bradypnea or respiratory distress. He is not intubated.      Breath sounds: Normal breath sounds. No stridor. No wheezing or rales.   Chest:      Chest wall: No tenderness.   Abdominal:      General: Bowel sounds are normal. There is no distension.      Palpations: Abdomen is soft.      Tenderness: There is no abdominal tenderness.   Musculoskeletal:         General: No deformity. Normal range of motion.      Cervical back: Normal range of motion and neck supple.   Skin:     Coloration: Skin is not pale.      Findings: No abrasion, bruising, ecchymosis, erythema or rash.   Neurological:      Mental Status: He is alert and oriented to person, place, and time.   Psychiatric:         Mood and Affect: Mood is not anxious or depressed. Affect is not labile, blunt, angry or inappropriate.         Speech: He is communicative. Speech is not slurred.         Behavior: Behavior is not agitated, aggressive, withdrawn or combative.          Labs   Lab Results   Component Value Date    WBC 4.1 03/20/2023    HGB 15.4 03/20/2023    HCT 45.5 03/20/2023     03/20/2023    CHOL 141 03/20/2023    TRIG 99 03/20/2023    HDL 52 03/20/2023    ALT 16 03/20/2023    AST 15 03/20/2023     03/20/2023    K 5.0 03/20/2023     (H) 03/20/2023    CREATININE 0.88  03/20/2023    BUN 22 03/20/2023    CO2 25 03/20/2023    PSA 0.4 03/20/2023   [id number]    Imaging    CATH    PTCA MARCH 2022    FINDINGS:  1. 80% mid-LAD stenosis.   2. 80% mid-ramus stenosis.   3. Nonobstructive disease throughout the remainder of the epicardial coronaries.      INTERVENTION:   Successful PCI of  LAD with a  [id number] Synergy BISI was deployed to 12 makenna. It was postdilated with a 3.0x12mm noncompliant balloon to 16 makenna          STRESS    Stress echo dec 2021  freq vpc neg ischemia  Somewhat equivocal stress echocardiogram due to frequent PVCs  No obvious ischemia with stress echocardiogram  The patient completed 12 METS of exercise completed stage IV excellent exercise tolerance  Baseline EKG frequent PVCs with stress exercise and recovery no complex arrhythmia  Baseline echocardiogram normal chamber sizes no regional wall motion abnormalities preserved ejection fraction 55%  No structural valvular disease  Trace mitral regurgitation  No obvious regional wall motion abnormalities all walls appear to be hyperdynamic with exercise        ECHO    ECHO MARCH 2023  Left Ventricle: Normal ventricle size. Normal wall thickness. Preserved systolic function. Estimated EF 55%. No regional wall motion abnormalities.  •  Right Ventricle: Normal ventricle size. Normal systolic function.  •  Left Atrium: Normal sized atrium. Redundant atrial septum present.  •  Right Atrium: Normal sized atrium.  •  Pulmonic Valve: Normal structure. No regurgitation. No stenosis.  •  Tricuspid Valve: Normal structure. Jet is insufficient to calculate pulmonary artery pressure. No regurgitation. No significant stenosis.  •  Mitral Valve: Normal leaflet structure. Normal leaflet motion. No regurgitation. No stenosis.  •  Aortic Valve: Tricuspid valve. No regurgitation. No stenosis.  •  Aorta: Aortic root normal. Sinuses of Valsalva normal-sized. Ascending aorta normal-sized. Aortic arch normal-sized. Descending aorta  normal-          CAT    COR CIARRA SCORE 1600 12/21        COR CTA FEB 2022      ELECTROPHYSIOLOGY    HOLTER NOV2021    The patient was monitored for 24 hours.  2. The predominant rhythm was sinus rhythm.  3. The average heart rate was 82 bpm.  4. The minimum heart rate was 63 bpm.  5. The maximum heart rate was 127 bpm.  6. There was 1 SVE tachycardia which lasted 4 beats 1.1 secs with an average heart rate of 159 bpm at 2:29 pm on 11/17/2021.  7. There were [id number]  15% vpcpremature ventricular beats. There were 934 ventricular bigeminy cycles. There were 10 ventricular couplets          ECG     Normal sinus rhythm outflow tract PVCs no change                        Nov 2021      Assessment/Plan     Post PTCA  He had abnormal coronary CT angiogram that led to cardiac catheterization and intervention  March 2022  Synergy stent placed in the mid LAD  Residual disease in a small ramus no intervention performed he has normal LV systolic function echocardiogram March 2023  Stopped his Plavix today continue long-term aspirin    Mixed hyperlipidemia  Cholesterol good not perfect LDL 71 increase rosuvastatin to 40 mg a day    Premature ventricular contraction  Frequent outflow tract PVCs asymptomatic, normal LV systolic function, no indication for therapy    Malignant melanoma of nose (CMS/HCC)  2021    Malignant neoplasm of large intestine (CMS/HCC)  Age 48 radiation chemotherapy.  No cardiac contraindication to undergoing colonoscopy.  Would recommend not interrupting aspirin therapy with his coronary stent, if possible              Status post stent in the mid LAD March 2022  Residual lesion small ramus branch no intervention required  This was picked up on coronary CT angiogram  He has a history of outflow tract PVCs normal LV systolic function no therapy  Normal LV systolic function echocardiogram today March 2023  Cholesterol good but not perfect I increased rosuvastatin to 40 mg daily  Is been 1 year since intervention  I stopped his clopidogrel continue aspirin 81 mg  No cardiac contraindication to colonoscopy  I would recommend not interrupting aspirin therapy  I will see him back in 9 months with lab work  Repeat coronary CT angiogram in about 3 years          This letter was generated using speech recognition software.  Please excuse any typographical errors.      Jarrett Caicedo MD  3/29/2023

## 2023-03-24 RX ORDER — CLOPIDOGREL BISULFATE 75 MG/1
TABLET ORAL
Qty: 90 TABLET | Refills: 6 | Status: SHIPPED | OUTPATIENT
Start: 2023-03-24 | End: 2023-03-29

## 2023-03-29 ENCOUNTER — OFFICE VISIT (OUTPATIENT)
Dept: CARDIOLOGY | Facility: CLINIC | Age: 63
End: 2023-03-29
Payer: MEDICARE

## 2023-03-29 ENCOUNTER — HOSPITAL ENCOUNTER (OUTPATIENT)
Dept: CARDIOLOGY | Facility: CLINIC | Age: 63
Discharge: HOME | End: 2023-03-29
Payer: MEDICARE

## 2023-03-29 VITALS
OXYGEN SATURATION: 98 % | HEART RATE: 83 BPM | HEIGHT: 68 IN | WEIGHT: 178 LBS | SYSTOLIC BLOOD PRESSURE: 140 MMHG | DIASTOLIC BLOOD PRESSURE: 80 MMHG | BODY MASS INDEX: 26.98 KG/M2

## 2023-03-29 VITALS
BODY MASS INDEX: 26.52 KG/M2 | HEIGHT: 68 IN | SYSTOLIC BLOOD PRESSURE: 128 MMHG | WEIGHT: 175 LBS | DIASTOLIC BLOOD PRESSURE: 80 MMHG

## 2023-03-29 DIAGNOSIS — I49.3 PREMATURE VENTRICULAR CONTRACTION: ICD-10-CM

## 2023-03-29 DIAGNOSIS — C18.9 MALIGNANT NEOPLASM OF COLON, UNSPECIFIED PART OF COLON (CMS/HCC): ICD-10-CM

## 2023-03-29 DIAGNOSIS — C43.59 MELANOMA OF BACK (CMS/HCC): ICD-10-CM

## 2023-03-29 DIAGNOSIS — Z98.61 POST PTCA: ICD-10-CM

## 2023-03-29 DIAGNOSIS — E78.2 MIXED HYPERLIPIDEMIA: Primary | ICD-10-CM

## 2023-03-29 DIAGNOSIS — C43.31 MALIGNANT MELANOMA OF NOSE (CMS/HCC): ICD-10-CM

## 2023-03-29 PROBLEM — Z00.00 ENCOUNTER FOR GENERAL ADULT MEDICAL EXAMINATION WITHOUT ABNORMAL FINDINGS: Status: RESOLVED | Noted: 2020-10-01 | Resolved: 2023-03-29

## 2023-03-29 LAB
AORTIC ROOT ANNULUS: 3.2 CM
AV PEAK GRADIENT: 8 MMHG
AV PEAK VELOCITY-S: 1.39 M/S
AV VALVE AREA: 1.76 CM2
BSA FOR ECHO PROCEDURE: 1.97 M2
CUSP SEPARATION: 1.9 CM
E WAVE DECELERATION TIME: 162 MS
E/A RATIO: 0.7
E/E' RATIO: 5.6
E/LAT E' RATIO: 5.9
EDV (BP): 69.8 CM3
EF (A4C): 55.4 %
EF A2C: 51.3 %
EJECTION FRACTION: 53.6 %
ESV (BP): 32.4 CM3
FRACTIONAL SHORTENING: 34.33 %
INTERVENTRICULAR SEPTUM: 0.98 CM
LA ESV (BP): 30.2 CM3
LA ESV INDEX (A2C): 22.03 CM3/M2
LA ESV INDEX (BP): 15.33 CM3/M2
LA/AORTA RATIO: 1.06
LAAS-AP2: 16.1 CM2
LAAS-AP4: 10.7 CM2
LAD 2D: 3.4 CM
LALD A4C: 4.41 CM
LALD A4C: 4.87 CM
LAV-S: 43.4 CM3
LEFT ATRIUM VOLUME INDEX: 9.7 CM3/M2
LEFT ATRIUM VOLUME: 19.1 CM3
LEFT INTERNAL DIMENSION IN SYSTOLE: 3.29 CM (ref 2.78–4.21)
LEFT VENTRICLE DIASTOLIC VOLUME INDEX: 31.98 CM3/M2
LEFT VENTRICLE DIASTOLIC VOLUME: 63 CM3
LEFT VENTRICLE SYSTOLIC VOLUME INDEX: 14.26 CM3/M2
LEFT VENTRICLE SYSTOLIC VOLUME: 28.1 CM3
LEFT VENTRICULAR INTERNAL DIMENSION IN DIASTOLE: 5.01 CM (ref 4.72–6.55)
LEFT VENTRICULAR POSTERIOR WALL IN END DIASTOLE: 0.97 CM (ref 0.61–1.15)
LV DIASTOLIC VOLUME: 74.7 CM3
LV ESV (APICAL 2 CHAMBER): 36.5 CM3
LVAD-AP2: 27 CM2
LVAD-AP4: 25.2 CM2
LVAS-AP2: 16.8 CM2
LVAS-AP4: 15.1 CM2
LVEDVI(A2C): 37.92 CM3/M2
LVEDVI(BP): 35.43 CM3/M2
LVESVI(A2C): 18.53 CM3/M2
LVESVI(BP): 16.45 CM3/M2
LVLD-AP2: 8.12 CM
LVLD-AP4: 8.44 CM
LVLS-AP2: 6.82 CM
LVLS-AP4: 7.04 CM
LVOT 2D: 2.2 CM
LVOT A: 3.8 CM2
LVOT PEAK VELOCITY: 0.82 M/S
LVOT PG: 3 MMHG
MLH CV ECHO AVA INDEX VELOCITY RATIO: 0.9
MV E'TISSUE VEL-LAT: 0.07 M/S
MV E'TISSUE VEL-MED: 0.08 M/S
MV PEAK A VEL: 0.61 M/S
MV PEAK E VEL: 0.44 M/S
POSTERIOR WALL: 0.97 CM
RVOT VMAX: 0.57 M/S
SEPTAL TISSUE DOPPLER FREE WALL LATE DIA VELOCITY (APICAL 4 CHAMBER VIEW): 0.14 M/S
Z-SCORE OF LEFT VENTRICULAR DIMENSION IN END DIASTOLE: -1.04
Z-SCORE OF LEFT VENTRICULAR DIMENSION IN END SYSTOLE: -0.31
Z-SCORE OF LEFT VENTRICULAR POSTERIOR WALL IN END DIASTOLE: 0.76

## 2023-03-29 PROCEDURE — 99214 OFFICE O/P EST MOD 30 MIN: CPT | Performed by: INTERNAL MEDICINE

## 2023-03-29 PROCEDURE — 93306 TTE W/DOPPLER COMPLETE: CPT | Performed by: INTERNAL MEDICINE

## 2023-03-29 PROCEDURE — 93000 ELECTROCARDIOGRAM COMPLETE: CPT | Performed by: INTERNAL MEDICINE

## 2023-03-29 RX ORDER — ROSUVASTATIN CALCIUM 40 MG/1
40 TABLET, COATED ORAL DAILY
Qty: 90 TABLET | Refills: 1 | Status: SHIPPED | OUTPATIENT
Start: 2023-03-29 | End: 2023-04-11 | Stop reason: SDUPTHER

## 2023-03-29 RX ORDER — ROSUVASTATIN CALCIUM 40 MG/1
40 TABLET, COATED ORAL 3 TIMES WEEKLY
Qty: 36 TABLET | Refills: 3 | Status: SHIPPED | OUTPATIENT
Start: 2023-03-29 | End: 2023-03-29

## 2023-03-29 NOTE — ASSESSMENT & PLAN NOTE
He had abnormal coronary CT angiogram that led to cardiac catheterization and intervention  March 2022  Synergy stent placed in the mid LAD  Residual disease in a small ramus no intervention performed he has normal LV systolic function echocardiogram March 2023  Stopped his Plavix today continue long-term aspirin

## 2023-03-29 NOTE — ASSESSMENT & PLAN NOTE
Age 48 radiation chemotherapy.  No cardiac contraindication to undergoing colonoscopy.  Would recommend not interrupting aspirin therapy with his coronary stent, if possible

## 2023-03-30 ENCOUNTER — TELEPHONE (OUTPATIENT)
Dept: CARDIOLOGY | Facility: CLINIC | Age: 63
End: 2023-03-30
Payer: MEDICARE

## 2023-03-30 NOTE — TELEPHONE ENCOUNTER
Last OV includes clearance and EKG faxed to Dr. Frankel at [phone].  Received a confirmation fax that info delivered.

## 2023-04-11 DIAGNOSIS — E78.2 MIXED HYPERLIPIDEMIA: Primary | ICD-10-CM

## 2023-04-11 RX ORDER — ROSUVASTATIN CALCIUM 40 MG/1
40 TABLET, COATED ORAL DAILY
Qty: 90 TABLET | Refills: 3 | Status: SHIPPED | OUTPATIENT
Start: 2023-04-11 | End: 2023-04-12 | Stop reason: SDUPTHER

## 2023-04-12 DIAGNOSIS — E78.2 MIXED HYPERLIPIDEMIA: ICD-10-CM

## 2023-04-12 RX ORDER — ROSUVASTATIN CALCIUM 40 MG/1
40 TABLET, COATED ORAL DAILY
Qty: 90 TABLET | Refills: 3 | Status: SHIPPED | OUTPATIENT
Start: 2023-04-12 | End: 2023-05-16 | Stop reason: SDUPTHER

## 2023-05-16 DIAGNOSIS — E78.2 MIXED HYPERLIPIDEMIA: ICD-10-CM

## 2023-05-16 RX ORDER — ROSUVASTATIN CALCIUM 40 MG/1
40 TABLET, COATED ORAL DAILY
Qty: 90 TABLET | Refills: 3 | Status: SHIPPED | OUTPATIENT
Start: 2023-05-16 | End: 2023-05-18 | Stop reason: SDUPTHER

## 2023-05-18 DIAGNOSIS — E78.2 MIXED HYPERLIPIDEMIA: ICD-10-CM

## 2023-05-18 RX ORDER — ROSUVASTATIN CALCIUM 40 MG/1
40 TABLET, COATED ORAL DAILY
Qty: 90 TABLET | Refills: 3 | Status: SHIPPED | OUTPATIENT
Start: 2023-05-18 | End: 2023-06-13 | Stop reason: SDUPTHER

## 2023-06-13 DIAGNOSIS — E78.2 MIXED HYPERLIPIDEMIA: ICD-10-CM

## 2023-06-13 RX ORDER — ROSUVASTATIN CALCIUM 40 MG/1
40 TABLET, COATED ORAL DAILY
Qty: 90 TABLET | Refills: 3 | Status: SHIPPED | OUTPATIENT
Start: 2023-06-13 | End: 2024-01-03 | Stop reason: SDUPTHER

## 2023-10-12 ENCOUNTER — OFFICE VISIT (OUTPATIENT)
Dept: FAMILY MEDICINE | Facility: CLINIC | Age: 63
End: 2023-10-12
Payer: MEDICARE

## 2023-10-12 VITALS
TEMPERATURE: 97.5 F | WEIGHT: 177 LBS | BODY MASS INDEX: 26.83 KG/M2 | HEIGHT: 68 IN | HEART RATE: 83 BPM | SYSTOLIC BLOOD PRESSURE: 120 MMHG | OXYGEN SATURATION: 97 % | DIASTOLIC BLOOD PRESSURE: 75 MMHG

## 2023-10-12 DIAGNOSIS — C43.59 MELANOMA OF BACK (CMS/HCC): ICD-10-CM

## 2023-10-12 DIAGNOSIS — E78.2 MIXED HYPERLIPIDEMIA: ICD-10-CM

## 2023-10-12 DIAGNOSIS — C18.9 MALIGNANT NEOPLASM OF COLON, UNSPECIFIED PART OF COLON (CMS/HCC): ICD-10-CM

## 2023-10-12 DIAGNOSIS — C43.31 MALIGNANT MELANOMA OF NOSE (CMS/HCC): ICD-10-CM

## 2023-10-12 DIAGNOSIS — I25.10 CORONARY ARTERY DISEASE INVOLVING NATIVE CORONARY ARTERY OF NATIVE HEART WITHOUT ANGINA PECTORIS: Primary | ICD-10-CM

## 2023-10-12 DIAGNOSIS — Z12.5 SCREENING FOR PROSTATE CANCER: ICD-10-CM

## 2023-10-12 PROCEDURE — 99214 OFFICE O/P EST MOD 30 MIN: CPT | Mod: 25 | Performed by: FAMILY MEDICINE

## 2023-10-12 PROCEDURE — G0009 ADMIN PNEUMOCOCCAL VACCINE: HCPCS | Performed by: FAMILY MEDICINE

## 2023-10-12 PROCEDURE — 90677 PCV20 VACCINE IM: CPT | Performed by: FAMILY MEDICINE

## 2023-10-12 ASSESSMENT — ENCOUNTER SYMPTOMS
SHORTNESS OF BREATH: 0
HEADACHES: 0
WEAKNESS: 0
NAUSEA: 0
PALPITATIONS: 0
APPETITE CHANGE: 0
ACTIVITY CHANGE: 0
NUMBNESS: 0
DIZZINESS: 0

## 2023-10-12 ASSESSMENT — PATIENT HEALTH QUESTIONNAIRE - PHQ9: SUM OF ALL RESPONSES TO PHQ9 QUESTIONS 1 & 2: 0

## 2023-10-12 NOTE — PATIENT INSTRUCTIONS
Labs before seeing Cardiology  Pneumonia shot today - don't need another  COVID shot through pharmacy

## 2023-10-12 NOTE — ASSESSMENT & PLAN NOTE
Chronic  Stable  Controlled  3/23 LDL 71  Continue with Rosuvastatin 40 and ASA  Denies CP, SOB  Very active

## 2023-10-12 NOTE — PROGRESS NOTES
[facility] Medicine  [address] [place] [id number]  [phone]       Reason for visit:   Chief Complaint   Patient presents with    Annual Exam      HPI   Dane Beckwith is a 63 y.o. male who presents with chronic follow up   - CAD/HLD  Taking Rosuvastatin 40 and ASA  Sees Cardiology 1/24  Stent in mid LAD 3/22  Plavix was stopped 3/23  3/23 Tc 141 LDL 71  Exercising daily - 4 mi on treadmill, weights 5x/wk  Diet is OK - weekends worse than during the week  Weight is stable  - Melanoma  Sees Dermatology  Every 6 months  Recently seen in 8/23  No new lesions/concerns  - Colon Cancer  16 y ago  Colonoscopy every 5 y  Last 4/23   Past Medical History:   Diagnosis Date    Colon cancer (CMS/HCC)     + radiation, chemo and surgery     Coronary artery disease     Malignant melanoma of nose (CMS/HCC) 10/06/2021    Melanoma of back (CMS/HCC) 10/06/2021    Premature ventricular contraction 10/06/2021     Past Surgical History:   Procedure Laterality Date    CARDIAC SURGERY      COLECTOMY      SKIN BIOPSY      nose for melanoma     Social History     Socioeconomic History    Marital status:      Spouse name: Not on file    Number of children: Not on file    Years of education: Not on file    Highest education level: Not on file   Occupational History    Not on file   Tobacco Use    Smoking status: Never    Smokeless tobacco: Never   Vaping Use    Vaping Use: Never used   Substance and Sexual Activity    Alcohol use: Yes     Alcohol/week: 8.0 standard drinks of alcohol     Types: 8 Cans of beer per week     Comment: 6-8/wk    Drug use: No    Sexual activity: Yes     Partners: Female   Other Topics Concern    Not on file   Social History Narrative    Do you wear your seatbelt? Yes    Do you have smoke detector in your home? Yes    Do you have a carbon monoxide detector in your home? 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   "    Social Determinants of Health     Financial Resource Strain: Not on file   Food Insecurity: Not on file   Transportation Needs: Not on file   Physical Activity: Not on file   Stress: Not on file   Social Connections: Not on file   Intimate Partner Violence: Not on file   Housing Stability: Not on file     Family History   Problem Relation Age of Onset    Breast cancer Biological Mother     Colon cancer Biological Father     Heart disease Biological Father     Heart disease Father's Brother     Hyperlipidemia Biological Brother     Hypertension Biological Brother     Hyperlipidemia Biological Brother     Hypertension Biological Brother      Amoxicillin trihydrate, Penicillins, and Potassium clavulanate  Current Outpatient Medications   Medication Sig Dispense Refill    ascorbic acid (VITAMIN C ORAL) Take by mouth.      aspirin 81 mg chewable tablet Take 81 mg by mouth daily.      cholecalciferol, vitamin D3, (VITAMIN D3 ORAL) Take 1 capsule by mouth once.      rosuvastatin (CRESTOR) 40 mg tablet Take 1 tablet (40 mg total) by mouth daily. 90 tablet 3     No current facility-administered medications for this visit.       Review of Systems   Constitutional: Negative for activity change and appetite change.   Respiratory: Negative for shortness of breath.    Cardiovascular: Negative for chest pain, palpitations and leg swelling.   Gastrointestinal: Negative for nausea.   Neurological: Negative for dizziness, weakness, numbness and headaches.     Objective   Vitals:    10/12/23 0909   BP: 120/75   BP Location: Right upper arm   Patient Position: Sitting   Pulse: 83   Temp: 36.4 °C (97.5 °F)   TempSrc: Temporal   SpO2: 97%   Weight: 80.3 kg (177 lb)   Height: 1.727 m (5' 8\")       Physical Exam  Vitals reviewed.   Constitutional:       Appearance: He is well-developed.   Cardiovascular:      Rate and Rhythm: Normal rate and regular rhythm.      Heart sounds: Normal heart sounds. No murmur heard.     No " friction rub. No gallop.   Pulmonary:      Effort: Pulmonary effort is normal.      Breath sounds: Normal breath sounds. No wheezing or rales.   Abdominal:      General: There is no distension.      Palpations: There is no mass.      Tenderness: There is no abdominal tenderness. There is no guarding or rebound.      Hernia: No hernia is present.   Musculoskeletal:      Right lower leg: No edema.      Left lower leg: No edema.   Neurological:      Mental Status: He is alert and oriented to person, place, and time.         Procedures    Lab Results   Component Value Date    WBC 4.1 03/20/2023    HGB 15.4 03/20/2023    HCT 45.5 03/20/2023     03/20/2023    CHOL 141 03/20/2023    TRIG 99 03/20/2023    HDL 52 03/20/2023    ALT 16 03/20/2023    AST 15 03/20/2023     03/20/2023    K 5.0 03/20/2023     (H) 03/20/2023    CREATININE 0.88 03/20/2023    BUN 22 03/20/2023    CO2 25 03/20/2023    PSA 0.4 03/20/2023         Assessment   Problem List Items Addressed This Visit        Circulatory    Coronary artery disease involving native coronary artery of native heart without angina pectoris - Primary     Chronic  Stable  Controlled  3/23 LDL 71  Continue with Rosuvastatin 40 and ASA  Denies CP, SOB  Very active            Digestive    Malignant neoplasm of large intestine (CMS/HCC)     Chronic  Stable  4/23 Colonoscopy - repeat in 5 y            Ears/Nose/Throat    Malignant melanoma of nose (CMS/HCC)     Chronic  Saw Derm 6 wk ago            Other    Melanoma of back (CMS/HCC)     Chronic  Stable  Sees Derm         Mixed hyperlipidemia     Chronic  Stable  Labs before Cardiology        Other Visit Diagnoses     Screening for prostate cancer        Relevant Orders    PSA              Alexander Duron MD  10/12/2023

## 2023-12-19 LAB
ALBUMIN SERPL-MCNC: 4.3 G/DL (ref 3.9–4.9)
ALBUMIN/GLOB SERPL: 2.2 {RATIO} (ref 1.2–2.2)
ALP SERPL-CCNC: 79 IU/L (ref 44–121)
ALT SERPL-CCNC: 16 IU/L (ref 0–44)
AST SERPL-CCNC: 19 IU/L (ref 0–40)
BASOPHILS # BLD AUTO: 0 X10E3/UL (ref 0–0.2)
BASOPHILS NFR BLD AUTO: 1 %
BILIRUB SERPL-MCNC: 0.5 MG/DL (ref 0–1.2)
BUN SERPL-MCNC: 15 MG/DL (ref 8–27)
BUN/CREAT SERPL: 16 (ref 10–24)
CALCIUM SERPL-MCNC: 9.2 MG/DL (ref 8.6–10.2)
CHLORIDE SERPL-SCNC: 107 MMOL/L (ref 96–106)
CHOLEST SERPL-MCNC: 135 MG/DL (ref 100–199)
CO2 SERPL-SCNC: 22 MMOL/L (ref 20–29)
CREAT SERPL-MCNC: 0.93 MG/DL (ref 0.76–1.27)
EGFRCR SERPLBLD CKD-EPI 2021: 91 ML/MIN/1.73
EOSINOPHIL # BLD AUTO: 0 X10E3/UL (ref 0–0.4)
EOSINOPHIL NFR BLD AUTO: 1 %
ERYTHROCYTE [DISTWIDTH] IN BLOOD BY AUTOMATED COUNT: 13.5 % (ref 11.6–15.4)
GLOBULIN SER CALC-MCNC: 2 G/DL (ref 1.5–4.5)
GLUCOSE SERPL-MCNC: 90 MG/DL (ref 70–99)
HCT VFR BLD AUTO: 41.8 % (ref 37.5–51)
HDLC SERPL-MCNC: 52 MG/DL
HGB BLD-MCNC: 14.2 G/DL (ref 13–17.7)
IMM GRANULOCYTES # BLD AUTO: 0 X10E3/UL (ref 0–0.1)
IMM GRANULOCYTES NFR BLD AUTO: 0 %
LDLC SERPL CALC-MCNC: 64 MG/DL (ref 0–99)
LYMPHOCYTES # BLD AUTO: 0.9 X10E3/UL (ref 0.7–3.1)
LYMPHOCYTES NFR BLD AUTO: 22 %
MCH RBC QN AUTO: 27.6 PG (ref 26.6–33)
MCHC RBC AUTO-ENTMCNC: 34 G/DL (ref 31.5–35.7)
MCV RBC AUTO: 81 FL (ref 79–97)
MONOCYTES # BLD AUTO: 0.4 X10E3/UL (ref 0.1–0.9)
MONOCYTES NFR BLD AUTO: 10 %
NEUTROPHILS # BLD AUTO: 2.7 X10E3/UL (ref 1.4–7)
NEUTROPHILS NFR BLD AUTO: 66 %
PLATELET # BLD AUTO: 209 X10E3/UL (ref 150–450)
POTASSIUM SERPL-SCNC: 4.5 MMOL/L (ref 3.5–5.2)
PROT SERPL-MCNC: 6.3 G/DL (ref 6–8.5)
PSA SERPL-MCNC: 0.4 NG/ML (ref 0–4)
RBC # BLD AUTO: 5.15 X10E6/UL (ref 4.14–5.8)
SODIUM SERPL-SCNC: 144 MMOL/L (ref 134–144)
TRIGL SERPL-MCNC: 102 MG/DL (ref 0–149)
VLDLC SERPL CALC-MCNC: 19 MG/DL (ref 5–40)
WBC # BLD AUTO: 4 X10E3/UL (ref 3.4–10.8)

## 2023-12-25 NOTE — PROGRESS NOTES
gCardiology Consult/New Patient    Alexander Duron MD          Dane Beckwith is a 63 y.o. male identifies as who presents with     He returns for follow-up and review his lab work  He has coronary disease with intervention  Abnormal CTA led to cardiac catheterization and he had coronary stent that was placed in the mid LAD March 2022 he had significant plaque burden coronary calcium score over 1600  He has residual disease in a small ramus no intervention he has normal LV systolic function  He has mixed hyperlipidemia   outflow tract PVCs normal LV systolic function no indication for chronic therapy  Melanoma of the nose treated in 2021  Cancer of the large intestine age 48 treated with surgery chemotherapy and radiation    Lab work December 2023  Cholesterol LDL 64  Creatinine 0.93 potassium 4.5  CBC normal                              Patient Active Problem List    Diagnosis Date Noted   • Post PTCA 03/07/2022   • Coronary artery disease involving native coronary artery of native heart without angina pectoris 12/30/2021   • Mixed hyperlipidemia 11/17/2021   • Family history of coronary artery disease 11/17/2021   • Malignant melanoma of nose (CMS/HCC) 10/06/2021   • Melanoma of back (CMS/HCC) 10/06/2021   • Premature ventricular contraction 10/06/2021   • Anemia 11/13/2017   • Malignant neoplasm of large intestine (CMS/HCC) 11/13/2017       Medical History:   Past Medical History:   Diagnosis Date   • Colon cancer (CMS/HCC)     + radiation, chemo and surgery    • Coronary artery disease    • Malignant melanoma of nose (CMS/HCC) 10/06/2021   • Melanoma of back (CMS/HCC) 10/06/2021   • Premature ventricular contraction 10/06/2021       Surgical History:   Past Surgical History:   Procedure Laterality Date   • CARDIAC SURGERY     • COLECTOMY     • SKIN BIOPSY      nose for melanoma       Allergies: Amoxicillin trihydrate, Penicillins, and Potassium clavulanate    Current Outpatient Medications   Medication Sig  Dispense Refill   • ascorbic acid (VITAMIN C ORAL) Take by mouth.     • aspirin 81 mg chewable tablet Take 81 mg by mouth daily.     • cholecalciferol, vitamin D3, (VITAMIN D3 ORAL) Take 1 capsule by mouth once.     • rosuvastatin (CRESTOR) 40 mg tablet Take 1 tablet (40 mg total) by mouth daily. 90 tablet 3     No current facility-administered medications for this visit.       Social History:   Social History     Socioeconomic History   • Marital status:      Spouse name: None   • Number of children: None   • Years of education: None   • Highest education level: None   Tobacco Use   • Smoking status: Never   • Smokeless tobacco: Never   Vaping Use   • Vaping Use: Never used   Substance and Sexual Activity   • Alcohol use: Yes     Alcohol/week: 8.0 standard drinks of alcohol     Types: 8 Cans of beer per week     Comment: 6-8/wk   • Drug use: No   • Sexual activity: Yes     Partners: Female   Social History Narrative    Do you wear your seatbelt? Yes    Do you have smoke detector in your home? Yes    Do you have a carbon monoxide detector in your home? 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        Family History:   Family History   Problem Relation Age of Onset   • Breast cancer Biological Mother    • Colon cancer Biological Father    • Heart disease Biological Father    • Heart disease Father's Brother    • Hyperlipidemia Biological Brother    • Hypertension Biological Brother    • Hyperlipidemia Biological Brother    • Hypertension Biological Brother        Review of Systems   ROS    Objective       Vitals:    01/03/24 0810   BP: 112/80   Pulse: 85   SpO2: 98%       Physical Exam  Constitutional:       General: He is not in acute distress.     Appearance: Normal appearance. He is well-developed. He is not ill-appearing, toxic-appearing or diaphoretic.      Interventions: He is not intubated.  HENT:      Head: Normocephalic. Not microcephalic. No raccoon eyes, abrasion or  contusion.      Nose: Nose normal.   Eyes:      General: No scleral icterus.        Left eye: No discharge.      Conjunctiva/sclera:      Right eye: Right conjunctiva is not injected.      Left eye: Left conjunctiva is not injected. No exudate or hemorrhage.     Pupils: Pupils are equal.   Neck:      Vascular: Normal carotid pulses. No carotid bruit or hepatojugular reflux.   Cardiovascular:      Rate and Rhythm: Normal rate and regular rhythm.      Chest Wall: PMI is not displaced.      Pulses: Normal pulses and intact distal pulses.      Heart sounds: Normal heart sounds. No murmur heard.     No friction rub. No gallop.   Pulmonary:      Effort: Pulmonary effort is normal. No tachypnea, bradypnea or respiratory distress. He is not intubated.      Breath sounds: Normal breath sounds. No stridor. No wheezing or rales.   Chest:      Chest wall: No tenderness.   Abdominal:      General: Bowel sounds are normal. There is no distension.      Palpations: Abdomen is soft.      Tenderness: There is no abdominal tenderness.   Musculoskeletal:         General: No deformity. Normal range of motion.      Cervical back: Normal range of motion and neck supple.   Skin:     Coloration: Skin is not pale.      Findings: No abrasion, bruising, ecchymosis, erythema or rash.   Neurological:      Mental Status: He is alert and oriented to person, place, and time.   Psychiatric:         Mood and Affect: Mood is not anxious or depressed. Affect is not labile, blunt, angry or inappropriate.         Speech: He is communicative. Speech is not slurred.         Behavior: Behavior is not agitated, aggressive, withdrawn or combative.          Labs   Lab Results   Component Value Date    WBC 4.0 12/18/2023    HGB 14.2 12/18/2023    HCT 41.8 12/18/2023     12/18/2023    CHOL 135 12/18/2023    TRIG 102 12/18/2023    HDL 52 12/18/2023    ALT 16 12/18/2023    AST 19 12/18/2023     12/18/2023    K 4.5 12/18/2023     (H) 12/18/2023     CREATININE 0.93 12/18/2023    BUN 15 12/18/2023    CO2 22 12/18/2023    PSA 0.4 12/18/2023   [id number]    Imaging    CATH    PTCA MARCH 2022    FINDINGS:  1. 80% mid-LAD stenosis.   2. 80% mid-ramus stenosis.   3. Nonobstructive disease throughout the remainder of the epicardial coronaries.      INTERVENTION:   Successful PCI of  LAD with a  3.86d26ub Synergy BISI was deployed to 12 makenna. It was postdilated with a 3.0x12mm noncompliant balloon to 16 makenna          STRESS    Stress echo dec 2021  freq vpc neg ischemia  Somewhat equivocal stress echocardiogram due to frequent PVCs  No obvious ischemia with stress echocardiogram  The patient completed 12 METS of exercise completed stage IV excellent exercise tolerance  Baseline EKG frequent PVCs with stress exercise and recovery no complex arrhythmia  Baseline echocardiogram normal chamber sizes no regional wall motion abnormalities preserved ejection fraction 55%  No structural valvular disease  Trace mitral regurgitation  No obvious regional wall motion abnormalities all walls appear to be hyperdynamic with exercise        ECHO    ECHO MARCH 2023  Left Ventricle: Normal ventricle size. Normal wall thickness. Preserved systolic function. Estimated EF 55%. No regional wall motion abnormalities.  •  Right Ventricle: Normal ventricle size. Normal systolic function.  •  Left Atrium: Normal sized atrium. Redundant atrial septum present.  •  Right Atrium: Normal sized atrium.  •  Pulmonic Valve: Normal structure. No regurgitation. No stenosis.  •  Tricuspid Valve: Normal structure. Jet is insufficient to calculate pulmonary artery pressure. No regurgitation. No significant stenosis.  •  Mitral Valve: Normal leaflet structure. Normal leaflet motion. No regurgitation. No stenosis.  •  Aortic Valve: Tricuspid valve. No regurgitation. No stenosis.  •  Aorta: Aortic root normal. Sinuses of Valsalva normal-sized. Ascending aorta normal-sized. Aortic arch normal-sized. Descending  aorta normal-          CAT    COR CIARRA SCORE 1600 12/21        COR CTA FEB 2022      ELECTROPHYSIOLOGY    HOLTER NOV2021    The patient was monitored for 24 hours.  2. The predominant rhythm was sinus rhythm.  3. The average heart rate was 82 bpm.  4. The minimum heart rate was 63 bpm.  5. The maximum heart rate was 127 bpm.  6. There was 1 SVE tachycardia which lasted 4 beats 1.1 secs with an average heart rate of 159 bpm at 2:29 pm on 11/17/2021.  7. There were 21903  15% vpcpremature ventricular beats. There were 934 ventricular bigeminy cycles. There were 10 ventricular couplets          ECG           January 2024 normal sinus rhythm outflow tract PVCs otherwise normal EKG stable                                    Nov 2021      Assessment/Plan     Post PTCA  He had abnormal coronary CT angiogram led to cardiac catheterization and intervention March 2022  Synergy stent placed to mid LAD residual disease in a small ramus no intervention.  Normal LV systolic function echocardiogram March 2023  Remains on statin and antiplatelet asymptomatic    Premature ventricular contraction  He outflow tract PVCs with normal LV systolic function asymptomatic on no chronic therapy  Keep an eye on LV systolic function if drop in ejection fraction would be candidate for ablation  Echocardiogram 1 year    Mixed hyperlipidemia  At his last visit I increased rosuvastatin to 40 mg LDL cholesterol at goal, 64    Malignant neoplasm of large intestine (CMS/HCC)  At age 48 radiation chemotherapy surgery    Malignant melanoma of nose (CMS/HCC)  2021              Status post stent in the mid LAD March 2022  Residual lesion small ramus branch no intervention required  Initial diagnosis made by coronary CT angiogram  He has extensive plaque.  Coronary calcium score is 1600  He has a history of outflow tract PVCs normal LV systolic function no therapy  Recheck LV systolic function in 1 year if any change would be indication for medical therapy  or ablation of VPCs  Normal LV systolic function echocardiogram today March 2023  Cholesterol cholesterol numbers are now perfect on rosuvastatin 40  I made no changes in medication today  He is interested in vascular screening will take care of that follow-up in 1 year with echo and lab work  Vascular screening          This letter was generated using speech recognition software.  Please excuse any typographical errors.      Jarrett Caicedo MD  1/3/2024

## 2024-01-03 ENCOUNTER — OFFICE VISIT (OUTPATIENT)
Dept: CARDIOLOGY | Facility: CLINIC | Age: 64
End: 2024-01-03
Payer: MEDICARE

## 2024-01-03 VITALS
WEIGHT: 180 LBS | SYSTOLIC BLOOD PRESSURE: 112 MMHG | DIASTOLIC BLOOD PRESSURE: 80 MMHG | OXYGEN SATURATION: 98 % | HEIGHT: 68 IN | HEART RATE: 85 BPM | BODY MASS INDEX: 27.28 KG/M2

## 2024-01-03 DIAGNOSIS — Z82.49 FAMILY HISTORY OF CORONARY ARTERY DISEASE: Primary | ICD-10-CM

## 2024-01-03 DIAGNOSIS — R09.89 BRUIT: ICD-10-CM

## 2024-01-03 DIAGNOSIS — I49.3 PREMATURE VENTRICULAR CONTRACTION: ICD-10-CM

## 2024-01-03 DIAGNOSIS — C43.31 MALIGNANT MELANOMA OF NOSE (CMS/HCC): ICD-10-CM

## 2024-01-03 DIAGNOSIS — E78.2 MIXED HYPERLIPIDEMIA: ICD-10-CM

## 2024-01-03 DIAGNOSIS — C18.9 MALIGNANT NEOPLASM OF COLON, UNSPECIFIED PART OF COLON (CMS/HCC): ICD-10-CM

## 2024-01-03 DIAGNOSIS — Z98.61 POST PTCA: ICD-10-CM

## 2024-01-03 PROCEDURE — 99214 OFFICE O/P EST MOD 30 MIN: CPT | Performed by: INTERNAL MEDICINE

## 2024-01-03 PROCEDURE — 93000 ELECTROCARDIOGRAM COMPLETE: CPT | Performed by: INTERNAL MEDICINE

## 2024-01-03 RX ORDER — ROSUVASTATIN CALCIUM 40 MG/1
40 TABLET, COATED ORAL DAILY
Qty: 90 TABLET | Refills: 3 | Status: SHIPPED | OUTPATIENT
Start: 2024-01-03 | End: 2025-01-22 | Stop reason: SDUPTHER

## 2024-01-03 NOTE — ASSESSMENT & PLAN NOTE
He outflow tract PVCs with normal LV systolic function asymptomatic on no chronic therapy  Keep an eye on LV systolic function if drop in ejection fraction would be candidate for ablation  Echocardiogram 1 year

## 2024-01-03 NOTE — ASSESSMENT & PLAN NOTE
He had abnormal coronary CT angiogram led to cardiac catheterization and intervention March 2022  Synergy stent placed to mid LAD residual disease in a small ramus no intervention.  Normal LV systolic function echocardiogram March 2023  Remains on statin and antiplatelet asymptomatic

## 2024-02-28 ENCOUNTER — APPOINTMENT (OUTPATIENT)
Age: 64
Setting detail: DERMATOLOGY
End: 2024-02-29

## 2024-02-28 DIAGNOSIS — D18.0 HEMANGIOMA: ICD-10-CM

## 2024-02-28 DIAGNOSIS — L57.8 OTHER SKIN CHANGES DUE TO CHRONIC EXPOSURE TO NONIONIZING RADIATION: ICD-10-CM

## 2024-02-28 DIAGNOSIS — L81.4 OTHER MELANIN HYPERPIGMENTATION: ICD-10-CM

## 2024-02-28 DIAGNOSIS — L82.1 OTHER SEBORRHEIC KERATOSIS: ICD-10-CM

## 2024-02-28 DIAGNOSIS — D22 MELANOCYTIC NEVI: ICD-10-CM

## 2024-02-28 DIAGNOSIS — L57.0 ACTINIC KERATOSIS: ICD-10-CM

## 2024-02-28 PROBLEM — D22.5 MELANOCYTIC NEVI OF TRUNK: Status: ACTIVE | Noted: 2024-02-28

## 2024-02-28 PROBLEM — D18.01 HEMANGIOMA OF SKIN AND SUBCUTANEOUS TISSUE: Status: ACTIVE | Noted: 2024-02-28

## 2024-02-28 PROBLEM — D22.61 MELANOCYTIC NEVI OF RIGHT UPPER LIMB, INCLUDING SHOULDER: Status: ACTIVE | Noted: 2024-02-28

## 2024-02-28 PROBLEM — D22.72 MELANOCYTIC NEVI OF LEFT LOWER LIMB, INCLUDING HIP: Status: ACTIVE | Noted: 2024-02-28

## 2024-02-28 PROCEDURE — OTHER SUNSCREEN RECOMMENDATIONS: OTHER

## 2024-02-28 PROCEDURE — 99213 OFFICE O/P EST LOW 20 MIN: CPT | Mod: 25

## 2024-02-28 PROCEDURE — OTHER LIQUID NITROGEN: OTHER

## 2024-02-28 PROCEDURE — OTHER MIPS QUALITY: OTHER

## 2024-02-28 PROCEDURE — OTHER COUNSELING: OTHER

## 2024-02-28 PROCEDURE — 17000 DESTRUCT PREMALG LESION: CPT

## 2024-02-28 ASSESSMENT — LOCATION DETAILED DESCRIPTION DERM
LOCATION DETAILED: EPIGASTRIC SKIN
LOCATION DETAILED: RIGHT MEDIAL INFERIOR CHEST
LOCATION DETAILED: RIGHT PROXIMAL POSTERIOR UPPER ARM
LOCATION DETAILED: LEFT INFERIOR MEDIAL MIDBACK
LOCATION DETAILED: LEFT MEDIAL PLANTAR MIDFOOT
LOCATION DETAILED: RIGHT MEDIAL UPPER BACK
LOCATION DETAILED: XIPHOID
LOCATION DETAILED: RIGHT SUPERIOR UPPER BACK
LOCATION DETAILED: RIGHT LATERAL FOREHEAD

## 2024-02-28 ASSESSMENT — LOCATION SIMPLE DESCRIPTION DERM
LOCATION SIMPLE: LEFT PLANTAR SURFACE
LOCATION SIMPLE: RIGHT UPPER ARM
LOCATION SIMPLE: LEFT LOWER BACK
LOCATION SIMPLE: RIGHT UPPER BACK
LOCATION SIMPLE: ABDOMEN
LOCATION SIMPLE: RIGHT FOREHEAD
LOCATION SIMPLE: CHEST

## 2024-02-28 ASSESSMENT — LOCATION ZONE DERM
LOCATION ZONE: FEET
LOCATION ZONE: ARM
LOCATION ZONE: FACE
LOCATION ZONE: TRUNK

## 2024-09-11 ENCOUNTER — APPOINTMENT (OUTPATIENT)
Age: 64
Setting detail: DERMATOLOGY
End: 2024-09-11

## 2024-09-11 DIAGNOSIS — L81.4 OTHER MELANIN HYPERPIGMENTATION: ICD-10-CM

## 2024-09-11 DIAGNOSIS — D18.0 HEMANGIOMA: ICD-10-CM

## 2024-09-11 DIAGNOSIS — L57.0 ACTINIC KERATOSIS: ICD-10-CM

## 2024-09-11 DIAGNOSIS — L57.8 OTHER SKIN CHANGES DUE TO CHRONIC EXPOSURE TO NONIONIZING RADIATION: ICD-10-CM

## 2024-09-11 DIAGNOSIS — L82.1 OTHER SEBORRHEIC KERATOSIS: ICD-10-CM

## 2024-09-11 DIAGNOSIS — D22 MELANOCYTIC NEVI: ICD-10-CM

## 2024-09-11 PROBLEM — D18.01 HEMANGIOMA OF SKIN AND SUBCUTANEOUS TISSUE: Status: ACTIVE | Noted: 2024-09-11

## 2024-09-11 PROBLEM — D22.61 MELANOCYTIC NEVI OF RIGHT UPPER LIMB, INCLUDING SHOULDER: Status: ACTIVE | Noted: 2024-09-11

## 2024-09-11 PROBLEM — D22.5 MELANOCYTIC NEVI OF TRUNK: Status: ACTIVE | Noted: 2024-09-11

## 2024-09-11 PROCEDURE — 17003 DESTRUCT PREMALG LES 2-14: CPT

## 2024-09-11 PROCEDURE — OTHER COUNSELING: OTHER

## 2024-09-11 PROCEDURE — OTHER MIPS QUALITY: OTHER

## 2024-09-11 PROCEDURE — OTHER LIQUID NITROGEN: OTHER

## 2024-09-11 PROCEDURE — 17000 DESTRUCT PREMALG LESION: CPT

## 2024-09-11 PROCEDURE — 99213 OFFICE O/P EST LOW 20 MIN: CPT | Mod: 25

## 2024-09-11 PROCEDURE — OTHER SUNSCREEN RECOMMENDATIONS: OTHER

## 2024-09-11 ASSESSMENT — LOCATION ZONE DERM
LOCATION ZONE: ARM
LOCATION ZONE: TRUNK
LOCATION ZONE: FACE

## 2024-09-11 ASSESSMENT — LOCATION SIMPLE DESCRIPTION DERM
LOCATION SIMPLE: RIGHT FOREHEAD
LOCATION SIMPLE: LEFT LOWER BACK
LOCATION SIMPLE: CHEST
LOCATION SIMPLE: RIGHT UPPER BACK
LOCATION SIMPLE: RIGHT UPPER ARM
LOCATION SIMPLE: RIGHT BUTTOCK
LOCATION SIMPLE: ABDOMEN

## 2024-09-11 ASSESSMENT — LOCATION DETAILED DESCRIPTION DERM
LOCATION DETAILED: EPIGASTRIC SKIN
LOCATION DETAILED: RIGHT PROXIMAL POSTERIOR UPPER ARM
LOCATION DETAILED: RIGHT SUPERIOR FOREHEAD
LOCATION DETAILED: RIGHT SUPERIOR UPPER BACK
LOCATION DETAILED: RIGHT MEDIAL INFERIOR CHEST
LOCATION DETAILED: RIGHT MEDIAL UPPER BACK
LOCATION DETAILED: LEFT INFERIOR MEDIAL MIDBACK
LOCATION DETAILED: RIGHT SUPERIOR MEDIAL FOREHEAD
LOCATION DETAILED: PERIUMBILICAL SKIN
LOCATION DETAILED: LEFT MEDIAL INFERIOR CHEST
LOCATION DETAILED: RIGHT FOREHEAD
LOCATION DETAILED: RIGHT BUTTOCK

## 2024-09-11 NOTE — PROCEDURE: LIQUID NITROGEN
Duration Of Freeze Thaw-Cycle (Seconds): 5
Render Post-Care Instructions In Note?: no
Number Of Freeze-Thaw Cycles: 3 freeze-thaw cycles
Consent: The patient's consent was obtained including but not limited to risks of crusting, scabbing, blistering, scarring, darker or lighter pigmentary change, recurrence, incomplete removal and infection.
Show Aperture Variable?: Yes
Detail Level: Detailed
Post-Care Instructions: I reviewed with the patient in detail post-care instructions. Patient is to wear sunprotection, and avoid picking at any of the treated lesions. Pt may apply Vaseline to crusted or scabbing areas.
Application Tool (Optional): Liquid Nitrogen Sprayer

## 2024-09-17 ENCOUNTER — TELEPHONE (OUTPATIENT)
Dept: SCHEDULING | Facility: CLINIC | Age: 64
End: 2024-09-17
Payer: MEDICARE

## 2024-09-17 DIAGNOSIS — E78.2 MIXED HYPERLIPIDEMIA: Primary | ICD-10-CM

## 2024-09-17 DIAGNOSIS — Z82.49 FAMILY HISTORY OF CORONARY ARTERY DISEASE: ICD-10-CM

## 2024-09-17 DIAGNOSIS — I49.3 PREMATURE VENTRICULAR CONTRACTION: ICD-10-CM

## 2024-09-17 DIAGNOSIS — Z98.61 POST PTCA: ICD-10-CM

## 2024-09-17 NOTE — TELEPHONE ENCOUNTER
Dr Myles patient is scheduled on     2025.  Patient has scripts for echo, U/S abd, U/S carotid B/L in his chart.     These will  on 1/3/2025.      Please regenerate orders and call patient at [phone] to schedule     these studies the same dy as appointment. ty

## 2024-09-17 NOTE — TELEPHONE ENCOUNTER
He needs to schedule all three testing prior to his appt. May not need new order. I lvm fir him to call us back.

## 2024-10-22 SDOH — ECONOMIC STABILITY: FOOD INSECURITY: WITHIN THE PAST 12 MONTHS, THE FOOD YOU BOUGHT JUST DIDN'T LAST AND YOU DIDN'T HAVE MONEY TO GET MORE.: NEVER TRUE

## 2024-10-22 SDOH — ECONOMIC STABILITY: INCOME INSECURITY: IN THE LAST 12 MONTHS, WAS THERE A TIME WHEN YOU WERE NOT ABLE TO PAY THE MORTGAGE OR RENT ON TIME?: NO

## 2024-10-22 SDOH — ECONOMIC STABILITY: FOOD INSECURITY: WITHIN THE PAST 12 MONTHS, YOU WORRIED THAT YOUR FOOD WOULD RUN OUT BEFORE YOU GOT MONEY TO BUY MORE.: NEVER TRUE

## 2024-10-22 SDOH — ECONOMIC STABILITY: TRANSPORTATION INSECURITY
IN THE PAST 12 MONTHS, HAS LACK OF TRANSPORTATION KEPT YOU FROM MEETINGS, WORK, OR FROM GETTING THINGS NEEDED FOR DAILY LIVING?: NO

## 2024-10-22 SDOH — ECONOMIC STABILITY: TRANSPORTATION INSECURITY
IN THE PAST 12 MONTHS, HAS THE LACK OF TRANSPORTATION KEPT YOU FROM MEDICAL APPOINTMENTS OR FROM GETTING MEDICATIONS?: NO

## 2024-10-22 ASSESSMENT — SOCIAL DETERMINANTS OF HEALTH (SDOH): IN THE PAST 12 MONTHS, HAS THE ELECTRIC, GAS, OIL, OR WATER COMPANY THREATENED TO SHUT OFF SERVICE IN YOUR HOME?: NO

## 2024-11-03 SDOH — ECONOMIC STABILITY: FOOD INSECURITY: WITHIN THE PAST 12 MONTHS, YOU WORRIED THAT YOUR FOOD WOULD RUN OUT BEFORE YOU GOT MONEY TO BUY MORE.: NEVER TRUE

## 2024-11-03 SDOH — ECONOMIC STABILITY: FOOD INSECURITY: WITHIN THE PAST 12 MONTHS, THE FOOD YOU BOUGHT JUST DIDN'T LAST AND YOU DIDN'T HAVE MONEY TO GET MORE.: NEVER TRUE

## 2024-11-03 SDOH — ECONOMIC STABILITY: INCOME INSECURITY: IN THE LAST 12 MONTHS, WAS THERE A TIME WHEN YOU WERE NOT ABLE TO PAY THE MORTGAGE OR RENT ON TIME?: NO

## 2024-11-03 ASSESSMENT — SOCIAL DETERMINANTS OF HEALTH (SDOH): IN THE PAST 12 MONTHS, HAS THE ELECTRIC, GAS, OIL, OR WATER COMPANY THREATENED TO SHUT OFF SERVICE IN YOUR HOME?: NO

## 2024-11-07 ENCOUNTER — OFFICE VISIT (OUTPATIENT)
Dept: FAMILY MEDICINE | Facility: CLINIC | Age: 64
End: 2024-11-07
Payer: MEDICARE

## 2024-11-07 VITALS
SYSTOLIC BLOOD PRESSURE: 122 MMHG | HEIGHT: 68 IN | DIASTOLIC BLOOD PRESSURE: 70 MMHG | TEMPERATURE: 97.1 F | WEIGHT: 178.2 LBS | OXYGEN SATURATION: 97 % | BODY MASS INDEX: 27.01 KG/M2 | HEART RATE: 85 BPM

## 2024-11-07 VITALS
SYSTOLIC BLOOD PRESSURE: 122 MMHG | WEIGHT: 178.2 LBS | BODY MASS INDEX: 27.01 KG/M2 | HEIGHT: 68 IN | DIASTOLIC BLOOD PRESSURE: 70 MMHG | TEMPERATURE: 97.1 F | HEART RATE: 85 BPM

## 2024-11-07 DIAGNOSIS — M25.522 LEFT ELBOW PAIN: ICD-10-CM

## 2024-11-07 DIAGNOSIS — C18.9 MALIGNANT NEOPLASM OF COLON, UNSPECIFIED PART OF COLON (CMS/HCC): ICD-10-CM

## 2024-11-07 DIAGNOSIS — C43.31 MALIGNANT MELANOMA OF NOSE (CMS/HCC): ICD-10-CM

## 2024-11-07 DIAGNOSIS — C43.59 MELANOMA OF BACK (CMS/HCC): ICD-10-CM

## 2024-11-07 DIAGNOSIS — E78.2 MIXED HYPERLIPIDEMIA: Primary | ICD-10-CM

## 2024-11-07 DIAGNOSIS — Z12.5 SCREENING FOR PROSTATE CANCER: ICD-10-CM

## 2024-11-07 DIAGNOSIS — I25.10 CORONARY ARTERY DISEASE INVOLVING NATIVE CORONARY ARTERY OF NATIVE HEART WITHOUT ANGINA PECTORIS: ICD-10-CM

## 2024-11-07 DIAGNOSIS — Z00.00 MEDICARE ANNUAL WELLNESS VISIT, INITIAL: Primary | ICD-10-CM

## 2024-11-07 PROCEDURE — 99214 OFFICE O/P EST MOD 30 MIN: CPT | Mod: 25 | Performed by: FAMILY MEDICINE

## 2024-11-07 PROCEDURE — G0438 PPPS, INITIAL VISIT: HCPCS

## 2024-11-07 ASSESSMENT — ENCOUNTER SYMPTOMS
HEADACHES: 0
COUGH: 0
CHILLS: 0
SHORTNESS OF BREATH: 0
CONSTIPATION: 0
WEAKNESS: 0
NAUSEA: 0
APPETITE CHANGE: 0
DIZZINESS: 0
NUMBNESS: 0
ABDOMINAL PAIN: 0
ACTIVITY CHANGE: 0
PALPITATIONS: 0

## 2024-11-07 ASSESSMENT — PATIENT HEALTH QUESTIONNAIRE - PHQ9: SUM OF ALL RESPONSES TO PHQ9 QUESTIONS 1 & 2: 0

## 2024-11-07 ASSESSMENT — MINI COG
TOTAL SCORE: 5
COMPLETED: YES

## 2024-11-07 NOTE — ASSESSMENT & PLAN NOTE
Chronic  Stable  Controlled  LDL 12/23 was 64  Schedule for Cardiology and imaging 1/25  Denies CP SOB PETERS  Continue with Rosuvastatin 40 and ASA

## 2024-11-07 NOTE — PROGRESS NOTES
[facility]  [address] [place] [address]  [place] [address]  [phone]       Reason for visit:   Chief Complaint   Patient presents with    Follow-up      HPI   Dane Beckwith is a 64 y.o. male who presents with chronic follow up   - HLD/CAD  Taking Rosuvastatin 40 and ASA 81  Sees Cardiology 1/25 - Ling[facility]  Has ECHO, Carotid US, AAA US scheduled for 1/25 beforehand  12/23  LDL 64  Denies CP SOB PETERS palpitations dizziness  Exercise - M-F - 4 mi on treadmill, weights  Diet - Healthy  - Colon Cancer  Colonoscopy 2/23 repeat in 5 y  No longer seeing Oncology or CR Sx  Denies abdominal pain, blood in stool  - Melanoma  Sees Dermatology 2x/year  Had something frozen off on forehead last visit  - Elbow Pain  X 2-3 mo  Medial aspect  Worse when lifting something - grandkids, biceps curl  1/10 pain  No weakness, numbness   Past Medical History:   Diagnosis Date    Colon cancer (CMS/HCC)     + radiation, chemo and surgery     Coronary artery disease     Malignant melanoma of nose (CMS/HCC) 10/06/2021    Melanoma of back (CMS/HCC) 10/06/2021    Premature ventricular contraction 10/06/2021     Past Surgical History   Procedure Laterality Date    Cardiac surgery      Colectomy      LEFT HEART CATH WITH CORONARY ANGIOGRAPHY N/A 3/4/2022    Performed by Manda Philip MD at [facility] CARDIAC CATH/EP    Skin biopsy      nose for melanoma    Stent BISI coronary - initial vessel N/A 3/4/2022    Performed by Manda Philip MD at [facility] CARDIAC CATH/EP     Social History     Socioeconomic History    Marital status:      Spouse name: Not on file    Number of children: Not on file    Years of education: Not on file    Highest education level: Not on file   Occupational History    Not on file   Tobacco Use    Smoking status: Never    Smokeless tobacco: Never   Vaping Use    Vaping status: Never Used   Substance and Sexual Activity    Alcohol use: Yes     Alcohol/week: 8.0 standard drinks of alcohol      Types: 8 Cans of beer per week     Comment: 6-8/wk    Drug use: No    Sexual activity: Yes     Partners: Female   Other Topics Concern    Not on file   Social History Narrative    Do you wear your seatbelt? Yes    Do you have smoke detector in your home? Yes    Do you have a carbon monoxide detector in your home? 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      Social Drivers of Health     Financial Resource Strain: Not on file   Food Insecurity: No Food Insecurity (11/3/2024)    Hunger Vital Sign     Worried About Running Out of Food in the Last Year: Never true     Ran Out of Food in the Last Year: Never true   Transportation Needs: No Transportation Needs (11/3/2024)    PRAPARE - Transportation     Lack of Transportation (Medical): No     Lack of Transportation (Non-Medical): No   Physical Activity: Not on file   Stress: Not on file   Social Connections: Not on file   Intimate Partner Violence: Not on file   Housing Stability: Low Risk  (11/3/2024)    Housing Stability Vital Sign     Unable to Pay for Housing in the Last Year: No     Number of Times Moved in the Last Year: 0     Homeless in the Last Year: No     Family History   Problem Relation Name Age of Onset    Breast cancer Biological Mother      Colon cancer Biological Father      Heart disease Biological Father      Heart disease Father's Brother      Hyperlipidemia Biological Brother      Hypertension Biological Brother      Hyperlipidemia Biological Brother      Hypertension Biological Brother       Amoxicillin trihydrate, Penicillins, and Potassium clavulanate  Current Outpatient Medications   Medication Sig Dispense Refill    ascorbic acid (VITAMIN C ORAL) Take by mouth.      aspirin 81 mg chewable tablet Take 81 mg by mouth daily.      cholecalciferol, vitamin D3, (VITAMIN D3 ORAL) Take 1 capsule by mouth once.      rosuvastatin (CRESTOR) 40 mg tablet Take 1 tablet (40 mg total) by mouth daily. 90 tablet 3     No  "current facility-administered medications for this visit.       Review of Systems   Constitutional:  Negative for activity change, appetite change and chills.   Respiratory:  Negative for cough and shortness of breath.    Cardiovascular:  Negative for chest pain, palpitations and leg swelling.   Gastrointestinal:  Negative for abdominal pain, constipation and nausea.   Neurological:  Negative for dizziness, weakness, numbness and headaches.     Objective   Vitals:    11/07/24 0843   BP: 122/70   Patient Position: Sitting   Pulse: 85   Temp: 36.2 °C (97.1 °F)   Weight: 80.8 kg (178 lb 3.2 oz)   Height: 1.727 m (5' 8\")       Physical Exam  Vitals reviewed.   Constitutional:       General: He is not in acute distress.     Appearance: Normal appearance. He is well-developed. He is not ill-appearing.   Cardiovascular:      Rate and Rhythm: Normal rate and regular rhythm. Occasional Extrasystoles are present.     Heart sounds: Normal heart sounds. No murmur heard.     No friction rub. No gallop.   Pulmonary:      Effort: Pulmonary effort is normal.      Breath sounds: Normal breath sounds. No wheezing or rales.   Musculoskeletal:         General: No swelling or tenderness.      Right lower leg: No edema.      Left lower leg: No edema.      Comments: No TTP to L elbow. No swelling. 5/5 strength and sensation at elbow joint. Mild pain reproduced with resisted supination   Neurological:      Mental Status: He is alert and oriented to person, place, and time.         Procedures    Lab Results   Component Value Date    WBC 4.0 12/18/2023    HGB 14.2 12/18/2023    HCT 41.8 12/18/2023     12/18/2023    CHOL 135 12/18/2023    TRIG 102 12/18/2023    HDL 52 12/18/2023    ALT 16 12/18/2023    AST 19 12/18/2023     12/18/2023    K 4.5 12/18/2023     (H) 12/18/2023    CREATININE 0.93 12/18/2023    BUN 15 12/18/2023    CO2 22 12/18/2023    PSA 0.4 12/18/2023         Assessment   Problem List Items Addressed This " Visit       Malignant neoplasm of large intestine (CMS/HCC)     Chronic  Stable  Colonoscopy 4/23 - repeat in 5 y  Denies constipation, pain or blood in stool         Malignant melanoma of nose (CMS/HCC)     Chronic  Sees Derm 2x/y         Relevant Orders    CBC and Differential    Melanoma of back (CMS/HCC)     Chronic         Mixed hyperlipidemia - Primary     Chronic  Stable  Continue with Rosuvastatin         Relevant Orders    Comprehensive metabolic panel    Lipid panel    Coronary artery disease involving native coronary artery of native heart without angina pectoris     Chronic  Stable  Controlled  LDL 12/23 was 64  Schedule for Cardiology and imaging 1/25  Denies CP SOB PETERS  Continue with Rosuvastatin 40 and ASA          Other Visit Diagnoses       Screening for prostate cancer        Relevant Orders    PSA    Left elbow pain        New Problem  2-3 m  No limitations  When lifting things  Normal exam  Reassurance  WS reviewed        I attest that this visit supports the complexity inherent to evaluation and management associated with medical care services that serve as the continuing focal point for all needed health care services and/or medical care services that are part of ongoing care related to this patient's single, serious condition or a complex condition.          Alexander Duron MD  11/7/2024

## 2024-11-07 NOTE — PATIENT INSTRUCTIONS
Your Personalized Prevention Plan Services (PPPS)    Preventive Services Checklist (Assumes Average Risk Unless Otherwise Noted):    Health Maintenance Topics with due status: Overdue       Topic Date Due    COVID-19 Vaccine 09/01/2024     Health Maintenance Topics with due status: Not Due       Topic Last Completion Date    DTaP, Tdap, and Td Vaccines 08/14/2019    Colorectal Cancer Screening 04/26/2023    Falls Risk Screening 11/03/2024    Medicare Annual Wellness Visit 11/07/2024    Depression Screening 11/07/2024    RSV Vaccine Not Due     Health Maintenance Topics with due status: Completed       Topic Last Completion Date    Hepatitis C Screening 11/14/2017    Zoster Vaccine 04/10/2020    Pneumococcal (65 years and older) 10/12/2023    Influenza Vaccine 10/21/2024     Health Maintenance Topics with due status: Aged Out       Topic Date Due    Meningococcal ACWY Aged Out    RSV <20 months Aged Out    HIB Vaccines Aged Out    Hepatitis B Vaccines Aged Out    IPV Vaccines Aged Out    HPV Vaccines Aged Out       You May Be Eligible for These Additional Preventive Services   (Assumes Average Risk Unless Otherwise Noted)  Diabetes Screening Any 1 risk factor: hypertension, dyslipidemia, obesity, high glucose; or Any 2 risk factors: >=64yo, overweight, family history diabetes (covered every 6 months)   Hepatitis C Screening Any 1 risk factor: 1) blood transfusion before 1992,   2) current or past injection drug use (annually for high risk; if born between 0[birth year]86, see above for status).   Vaccine: Hepatitis B As necessary if at-risk: hemophilia, ESRD, diabetes, living with individual infected with hep B, healthcare worker with frequent contact with blood/bodily fluids (series covered once)   Sexually Transmitted Diseases (STDs) As necessary chlamydia, gonorrhea, syphilis, hepatitis B (covered annually)  HIV if any 1 risk factor present: 1) <16yo or >64yo and at increased risk or 2) 15-64yo and  ask for it (covered annually)   Lung Cancer Screening Low dose chest CT if all three risk factors: 1) 50-78yo, 2) smoker or quit within last 15y, 3) >=20 pack years (covered annually).  No results found for this or any previous visit.       Cholesterol Screening No signs or symptoms of cardiovascular disease (screening covered every 5 years).   Prostate Cancer Screening >= 49yo if patient desires test after weighting potential harms and benefits (covered annually)       Health Risk Factors with Personalized Education:  ----------------------------------------------------------------------------------------------------------------------  Controlling Your Cholesterol  Reduce the amount of saturated and trans fat in your diet.  Limit intake of red meat.  Consume only low-fat or non-fat/skim dairy.  Limit fried food.  Cook with vegetable oils.  Reduce your intake of sugary foods, sugary drinks and alcohol.  Eat a diet high in fruit, vegetables and whole grains.  Get protein from fish, poultry and a small portion of nuts.  Stay active.  Try to get at least 90 to 150 minutes of exercise per week.  Try brisk walking, swimming, bicycling or dancing.  Maintain a healthy weight by balancing your diet and exercise.  If you have been prescribed medication, take it regularly and exactly as prescribed. Let your PCP know if you have any problems or questions about your medication.  It’s important to know your cholesterol numbers.  When recommended by your PCP, get the cholesterol blood test.  ----------------------------------------------------------------------------------------------------------------------  Reducing Your Risk of Falls  Tell your PCP if any of your medications make you feel tired, dizzy, lightheaded or off-balance.  Maintain coordination, flexibility and balance by ensuring regular physical activity.  Limit alcohol intake to 1 drink per day.  Consider avoiding all alcohol intake.  Ensure good vision.  Visit an  ophthalmologist or optometrist regularly for vision screening or to make sure your glasses / contact lens prescription is correct.  If you need glasses or contacts, wear them.  When you get new glasses or contacts, take time to get used to them.  Do not wear sunglasses or tinted lenses when indoors.  Ensure good hearing.  Have your hearing checked if you are having trouble hearing, or family and friends think you cannot hear them.  If you need a hearing aid, be sure it fits well and wear it.  Get enough rest.  Ensure about 7-9 hours of sleep every day.  Get up slowly from your bed or chairs.  Do not start walking until you are sure you feel steady.  Wear non-skid, rubber-soled, low-heeled shoes.  Do not walk in socks, or in shoes and slippers with smooth soles.  If your PCP or therapist recommends using a cane or walker, use it regularly.  Make your home safer.  Increase lighting throughout the house, especially at the top and bottom of stairs.  Ensure lighting is easily turned on when getting up in the middle of the night.  Make sure there are two secure rails on all stairs.  Install grab bars in the bathtub / shower and near the toilet.  Consider using a shower chair and / or a hand-held shower.  Spread sand or salt on icy surfaces.  Beware of wet surfaces, which can be icy.  Tell your PCP if you have fallen.

## 2024-11-07 NOTE — PROGRESS NOTES
Subjective     Dane Beckwith is a 64 y.o. male who presents for an initial annual wellness visit.     Patient Care Team:  Alexander Duron MD as PCP - General  Frankel, Robert A, MD as Consulting Physician (Gastroenterology)  Jarrett Caicedo MD as Cardiologist (Cardiology)  Frankel, Robert A, MD as Consulting Physician (Gastroenterology)    Comprehensive Medical and Social History  Patient Active Problem List   Diagnosis    Anemia    Malignant neoplasm of large intestine (CMS/HCC)    Malignant melanoma of nose (CMS/HCC)    Melanoma of back (CMS/HCC)    Premature ventricular contraction    Mixed hyperlipidemia    Family history of coronary artery disease    Coronary artery disease involving native coronary artery of native heart without angina pectoris    Post PTCA     Past Medical History:   Diagnosis Date    Colon cancer (CMS/HCC)     + radiation, chemo and surgery     Coronary artery disease     Malignant melanoma of nose (CMS/HCC) 10/06/2021    Melanoma of back (CMS/HCC) 10/06/2021    Premature ventricular contraction 10/06/2021     Past Surgical History   Procedure Laterality Date    Cardiac surgery      Colectomy      LEFT HEART CATH WITH CORONARY ANGIOGRAPHY N/A 3/4/2022    Performed by Manda Philip MD at [facility] CARDIAC CATH/EP    Skin biopsy      nose for melanoma    Stent BISI coronary - initial vessel N/A 3/4/2022    Performed by Manda Philip MD at [facility] CARDIAC CATH/EP     Allergies   Allergen Reactions    Amoxicillin Trihydrate Rash    Penicillins Rash    Potassium Clavulanate Rash     Current Outpatient Medications   Medication Sig Dispense Refill    ascorbic acid (VITAMIN C ORAL) Take by mouth.      aspirin 81 mg chewable tablet Take 81 mg by mouth daily.      cholecalciferol, vitamin D3, (VITAMIN D3 ORAL) Take 1 capsule by mouth once.      rosuvastatin (CRESTOR) 40 mg tablet Take 1 tablet (40 mg total) by mouth daily. 90 tablet 3     No current facility-administered medications for this visit.     Social  "History     Tobacco Use    Smoking status: Never    Smokeless tobacco: Never   Vaping Use    Vaping status: Never Used   Substance Use Topics    Alcohol use: Yes     Alcohol/week: 8.0 standard drinks of alcohol     Types: 8 Cans of beer per week     Comment: 6-8/wk    Drug use: No     Family History   Problem Relation Name Age of Onset    Breast cancer Biological Mother      Colon cancer Biological Father      Heart disease Biological Father      Heart disease Father's Brother      Hyperlipidemia Biological Brother      Hypertension Biological Brother      Hyperlipidemia Biological Brother      Hypertension Biological Brother         Objective   Vitals  Vitals:    11/07/24 0822   BP: 122/70   BP Location: Left upper arm   Patient Position: Sitting   Pulse: 85   Temp: 36.2 °C (97.1 °F)   TempSrc: Temporal   SpO2: 97%   Weight: 80.8 kg (178 lb 3.2 oz)   Height: 1.727 m (5' 8\")     Body mass index is 27.1 kg/m².    Advanced Care Plan  Does patient have advance directive?: No (currently working on it, asked to provide copy)                                     PHQ  Will the patient answer the depression questions?: Yes   Little interest or pleasure in doing things: Not at all   Feeling down, depressed, or hopeless: Not at all   Depression Risk: 0                                             Mini Cog  Completed: Yes  Score: 5  Result: Negative      Get Up and Go  Result: Pass    STEADI Falls Risk  One or more falls in the last year: No           Has trouble stepping up onto a curb: No   Advised to use a cane or walker to get around safely: No   Often has to rush to the toilet: No   Feels unsteady when walking: No   Has lost some feeling in feet: No   Often feels sad or depressed: No   Steadies self on furniture while walking at home: No   Takes medication that makes him/her feel lightheaded or more tired than usual: No   Worried about falling: No   Takes medicine to sleep or improve mood: No   Needs to push with hands when " rising from a chair: No   Falls screen completed: Yes     Hearing and Vision Screening  No results found.  See HRA for relevant hearing screening response.    Diet and Exercise  Do you exercise regularly?: Yes   Do you feel that your diet is healthy?: Yes (generally)     Assessment/Plan   Diagnoses and all orders for this visit:    Medicare annual wellness visit, initial (Primary)  Annual Wellness visit completed; assessed for health risk factors.   PPPS form completed and reviewed with patient. See attached to encounter.    COUNSELING -   Counseled on general health maintenance measures: staying active and eating well.  Continue regular follow-up with PCP and specialists as recommended.   Discussed fall precautions & safety in the home  Discussed Advanced Care Planning      SCREENING -   - Hepatitis C. Screening: completed 2017 negative  - Prostate Cancer screening: normal PSA 0.4 12/2023  - Colorectal Cancer screening: hx colon ca, UTD 2/2023 5 yr follow up  - Lung Cancer screening: n/a never smoker   - Abdominal Aortic Aneurysm screening: n/a never smoker   - Immunizations: declines covid this fall, otherwise UTD           See Patient Instructions (the written plan) which was given to the patient for PPPS and health risk factors with interventions.

## 2024-11-23 LAB
ALBUMIN SERPL-MCNC: 4.2 G/DL (ref 3.9–4.9)
ALP SERPL-CCNC: 92 IU/L (ref 44–121)
ALT SERPL-CCNC: 17 IU/L (ref 0–44)
AST SERPL-CCNC: 18 IU/L (ref 0–40)
BASOPHILS # BLD AUTO: 0 X10E3/UL (ref 0–0.2)
BASOPHILS NFR BLD AUTO: 1 %
BILIRUB SERPL-MCNC: 0.5 MG/DL (ref 0–1.2)
BUN SERPL-MCNC: 15 MG/DL (ref 8–27)
BUN/CREAT SERPL: 17 (ref 10–24)
CALCIUM SERPL-MCNC: 9.6 MG/DL (ref 8.6–10.2)
CHLORIDE SERPL-SCNC: 105 MMOL/L (ref 96–106)
CHOLEST SERPL-MCNC: 136 MG/DL (ref 100–199)
CO2 SERPL-SCNC: 24 MMOL/L (ref 20–29)
CREAT SERPL-MCNC: 0.86 MG/DL (ref 0.76–1.27)
EGFRCR SERPLBLD CKD-EPI 2021: 95 ML/MIN/1.73
EOSINOPHIL # BLD AUTO: 0.1 X10E3/UL (ref 0–0.4)
EOSINOPHIL NFR BLD AUTO: 1 %
ERYTHROCYTE [DISTWIDTH] IN BLOOD BY AUTOMATED COUNT: 12.9 % (ref 11.6–15.4)
GLOBULIN SER CALC-MCNC: 2.4 G/DL (ref 1.5–4.5)
GLUCOSE SERPL-MCNC: 102 MG/DL (ref 70–99)
HCT VFR BLD AUTO: 47.6 % (ref 37.5–51)
HDLC SERPL-MCNC: 49 MG/DL
HGB BLD-MCNC: 15.8 G/DL (ref 13–17.7)
IMM GRANULOCYTES # BLD AUTO: 0 X10E3/UL (ref 0–0.1)
IMM GRANULOCYTES NFR BLD AUTO: 0 %
LDLC SERPL CALC-MCNC: 65 MG/DL (ref 0–99)
LYMPHOCYTES # BLD AUTO: 1.1 X10E3/UL (ref 0.7–3.1)
LYMPHOCYTES NFR BLD AUTO: 23 %
MCH RBC QN AUTO: 27.7 PG (ref 26.6–33)
MCHC RBC AUTO-ENTMCNC: 33.2 G/DL (ref 31.5–35.7)
MCV RBC AUTO: 83 FL (ref 79–97)
MONOCYTES # BLD AUTO: 0.5 X10E3/UL (ref 0.1–0.9)
MONOCYTES NFR BLD AUTO: 9 %
NEUTROPHILS # BLD AUTO: 3.2 X10E3/UL (ref 1.4–7)
NEUTROPHILS NFR BLD AUTO: 66 %
PLATELET # BLD AUTO: 208 X10E3/UL (ref 150–450)
POTASSIUM SERPL-SCNC: 4.9 MMOL/L (ref 3.5–5.2)
PROT SERPL-MCNC: 6.6 G/DL (ref 6–8.5)
PSA SERPL-MCNC: 0.4 NG/ML (ref 0–4)
RBC # BLD AUTO: 5.71 X10E6/UL (ref 4.14–5.8)
SODIUM SERPL-SCNC: 141 MMOL/L (ref 134–144)
TRIGL SERPL-MCNC: 121 MG/DL (ref 0–149)
VLDLC SERPL CALC-MCNC: 22 MG/DL (ref 5–40)
WBC # BLD AUTO: 4.8 X10E3/UL (ref 3.4–10.8)

## 2025-01-03 ENCOUNTER — HOSPITAL ENCOUNTER (OUTPATIENT)
Dept: CARDIOLOGY | Facility: CLINIC | Age: 65
Discharge: HOME | End: 2025-01-03
Attending: NURSE PRACTITIONER
Payer: MEDICARE

## 2025-01-03 ENCOUNTER — HOSPITAL ENCOUNTER (OUTPATIENT)
Dept: CARDIOLOGY | Facility: CLINIC | Age: 65
Discharge: HOME | End: 2025-01-03
Attending: INTERNAL MEDICINE
Payer: MEDICARE

## 2025-01-03 VITALS
BODY MASS INDEX: 27.74 KG/M2 | WEIGHT: 183 LBS | SYSTOLIC BLOOD PRESSURE: 130 MMHG | HEIGHT: 68 IN | DIASTOLIC BLOOD PRESSURE: 80 MMHG

## 2025-01-03 VITALS
HEIGHT: 68 IN | SYSTOLIC BLOOD PRESSURE: 130 MMHG | BODY MASS INDEX: 27.74 KG/M2 | WEIGHT: 183 LBS | DIASTOLIC BLOOD PRESSURE: 80 MMHG

## 2025-01-03 VITALS
HEIGHT: 68 IN | WEIGHT: 183 LBS | DIASTOLIC BLOOD PRESSURE: 80 MMHG | SYSTOLIC BLOOD PRESSURE: 130 MMHG | BODY MASS INDEX: 27.74 KG/M2

## 2025-01-03 DIAGNOSIS — R09.89 BRUIT: ICD-10-CM

## 2025-01-03 DIAGNOSIS — Z98.61 POST PTCA: ICD-10-CM

## 2025-01-03 DIAGNOSIS — Z82.49 FAMILY HISTORY OF CORONARY ARTERY DISEASE: ICD-10-CM

## 2025-01-03 DIAGNOSIS — E78.2 MIXED HYPERLIPIDEMIA: ICD-10-CM

## 2025-01-03 DIAGNOSIS — I49.3 PREMATURE VENTRICULAR CONTRACTION: ICD-10-CM

## 2025-01-03 DIAGNOSIS — C43.31 MALIGNANT MELANOMA OF NOSE (CMS/HCC): ICD-10-CM

## 2025-01-03 LAB
ABDOMINAL DIST AORTA AP: 2 CM
ABDOMINAL DIST AORTA TRANS: 2 CM
ABDOMINAL DIST AORTA VEL: 64 CM/S
ABDOMINAL LT COM ILIAC AP: 1.3 CM
ABDOMINAL LT COM ILIAC TRANS: 1.3 CM
ABDOMINAL LT COM ILIAC VEL: 149 CM/S
ABDOMINAL MID AORTA AP: 2 CM
ABDOMINAL MID AORTA TRANS: 2.1 CM
ABDOMINAL MID AORTA VEL: 85 CM/S
ABDOMINAL PROX AORTA AP: 2.2 CM
ABDOMINAL PROX AORTA TRANS: 2.2 CM
ABDOMINAL PROX AORTA VEL: 79 CM/S
ABDOMINAL RT COM ILIAC AP: 1.3 CM
ABDOMINAL RT COM ILIAC TRANS: 1.3 CM
ABDOMINAL RT COM ILIAC VEL: 98 CM/S
AORTIC ROOT ANNULUS: 3.8 CM
ASCENDING AORTA: 3.5 CM
AV PEAK GRADIENT: 8 MMHG
AV PEAK VELOCITY-S: 1.37 M/S
AV VALVE AREA: 1.86 CM2
BSA FOR ECHO PROCEDURE: 2 M2
DOP CALC LVOT STROKE VOLUME: 59.54 CM3
E WAVE DECELERATION TIME: 237 MS
E/A RATIO: 0.8
E/E' RATIO: 7.6
E/LAT E' RATIO: 6.6
EDV (BP): 94.2 CM3
EF (A4C): 72.5 %
EF A2C: 63.5 %
EJECTION FRACTION: 69.5 %
EST RIGHT VENT SYSTOLIC PRESSURE BY TRICUSPID REGURGITATION JET: 33 MMHG
ESV (BP): 28.7 CM3
FRACTIONAL SHORTENING: 25.56 %
INTERVENTRICULAR SEPTUM: 1.24 CM
LA ESV (BP): 52.8 CM3
LA ESV INDEX (A2C): 32.6 CM3/M2
LA ESV INDEX (BP): 26.4 CM3/M2
LA/AORTA RATIO: 1.08
LAAS-AP2: 21.7 CM2
LAAS-AP4: 16.9 CM2
LAD 2D: 4.1 CM
LALD A4C: 5.49 CM
LALD A4C: 5.78 CM
LAV-S: 65.2 CM3
LEFT ATRIUM VOLUME INDEX: 20.45 CM3/M2
LEFT ATRIUM VOLUME: 40.9 CM3
LEFT CCA DIST DIAS: 18 CM/S
LEFT CCA DIST SYS: 83.2 CM/S
LEFT CCA MID DIAS: 23.2 CM/S
LEFT CCA MID SYS: 112 CM/S
LEFT CCA PROX DIAS: 28.1 CM/S
LEFT CCA PROX SYS: 155 CM/S
LEFT COMMON ILIAC RATIO: 2.33
LEFT ICA DIST DIAS: 32.5 CM/S
LEFT ICA DIST SYS: 77.5 CM/S
LEFT ICA MID DIAS: 24.6 CM/S
LEFT ICA MID SYS: 68.7 CM/S
LEFT ICA PROX DIAS: 15.7 CM/S
LEFT ICA PROX SYS: 62.4 CM/S
LEFT ICA/CCA SYS: 0.93
LEFT INTERNAL DIMENSION IN SYSTOLE: 2.97 CM (ref 2.83–4.28)
LEFT SUBCLAVIAN SYS: 164 CM/S
LEFT VENTRICLE DIASTOLIC VOLUME INDEX: 56 CM3/M2
LEFT VENTRICLE DIASTOLIC VOLUME: 112 CM3
LEFT VENTRICLE SYSTOLIC VOLUME INDEX: 15.4 CM3/M2
LEFT VENTRICLE SYSTOLIC VOLUME: 30.8 CM3
LEFT VENTRICULAR INTERNAL DIMENSION IN DIASTOLE: 3.99 CM (ref 4.81–6.67)
LEFT VENTRICULAR POSTERIOR WALL IN END DIASTOLE: 1.22 CM (ref 0.62–1.16)
LT BULB EDV: 20.5 CM/S
LT BULB PSV: 67.7 CM/S
LT ECA PROX EDV: 14.3 CM/S
LT ECA PROX PSV: 80.1 CM/S
LT VERTEBRAL MID EDV: 24 CM/S
LT VERTEBRAL MID PSV: 89 CM/S
LV DIASTOLIC VOLUME: 72.7 CM3
LV ESV (APICAL 2 CHAMBER): 26.5 CM3
LVAD-AP2: 27.8 CM2
LVAD-AP4: 33 CM2
LVAS-AP2: 14.4 CM2
LVAS-AP4: 15.3 CM2
LVEDVI(A2C): 36.35 CM3/M2
LVEDVI(BP): 47.1 CM3/M2
LVESVI(A2C): 13.25 CM3/M2
LVESVI(BP): 14.35 CM3/M2
LVLD-AP2: 8.59 CM
LVLD-AP4: 7.8 CM
LVLS-AP2: 6.55 CM
LVLS-AP4: 6.48 CM
LVOT 2D: 2.1 CM
LVOT A: 3.46 CM2
LVOT MG: 2 MMHG
LVOT MV: 0.56 M/S
LVOT PEAK VELOCITY: 0.94 M/S
LVOT PG: 4 MMHG
LVOT STROKE VOLUME INDEX: 29.77 ML/M2
LVOT VTI: 17.2 CM
MLH CV ECHO AVA INDEX VELOCITY RATIO: 0.9
MV E'TISSUE VEL-LAT: 0.08 M/S
MV E'TISSUE VEL-MED: 0.07 M/S
MV PEAK A VEL: 0.66 M/S
MV PEAK E VEL: 0.54 M/S
MV STENOSIS PRESSURE HALF TIME: 69 MS
MV VALVE AREA P 1/2 METHOD: 3.19 CM2
POSTERIOR WALL: 1.22 CM
PULMONARY REGURGITATION LATE DIASTOLIC VELOCITY: 1.21 M/S
PV PEAK GRADIENT: 8 MMHG
PV PV: 1.42 M/S
RAP: 8 MMHG
RIGHT CCA DIST DIAS: 16.8 CM/S
RIGHT CCA DIST SYS: 87 CM/S
RIGHT CCA MID DIAS: 19.3 CM/S
RIGHT CCA MID SYS: 74.6 CM/S
RIGHT CCA PROX DIAS: 34.1 CM/S
RIGHT CCA PROX SYS: 182 CM/S
RIGHT COMMON ILIAC RATIO: 1.53
RIGHT ECA SYS: 89.9 CM/S
RIGHT ICA DIST DIAS: 39.9 CM/S
RIGHT ICA DIST SYS: 101 CM/S
RIGHT ICA MID DIAS: 33.5 CM/S
RIGHT ICA MID SYS: 82.2 CM/S
RIGHT ICA PROX DIAS: 16.3 CM/S
RIGHT ICA PROX SYS: 41.7 CM/S
RIGHT ICA/CCA SYS: 1.16
RIGHT SUBCLAVIAN SYS: 159 CM/S
RT BULB EDV: 17.4 CM/S
RT BULB PSV: 70.8 CM/S
RT ECA PROC EDV: 18.2 CM/S
RT VERTEBRAL MID EDV: 27 CM/S
RT VERTEBRAL MID PSV: 81 CM/S
RVOT VMAX: 0.72 M/S
RVOT VTI: 11.8 CM
SEPTAL TISSUE DOPPLER FREE WALL LATE DIA VELOCITY (APICAL 4 CHAMBER VIEW): 0.14 M/S
TR MAX PG: 25.4 MMHG
TRICUSPID VALVE PEAK REGURGITATION VELOCITY: 2.52 M/S
Z-SCORE OF LEFT VENTRICULAR DIMENSION IN END DIASTOLE: -3.5
Z-SCORE OF LEFT VENTRICULAR DIMENSION IN END SYSTOLE: -1.25
Z-SCORE OF LEFT VENTRICULAR POSTERIOR WALL IN END DIASTOLE: 1.87

## 2025-01-03 PROCEDURE — 93306 TTE W/DOPPLER COMPLETE: CPT | Performed by: STUDENT IN AN ORGANIZED HEALTH CARE EDUCATION/TRAINING PROGRAM

## 2025-01-03 PROCEDURE — 93880 EXTRACRANIAL BILAT STUDY: CPT | Performed by: STUDENT IN AN ORGANIZED HEALTH CARE EDUCATION/TRAINING PROGRAM

## 2025-01-03 PROCEDURE — 93978 VASCULAR STUDY: CPT | Performed by: STUDENT IN AN ORGANIZED HEALTH CARE EDUCATION/TRAINING PROGRAM

## 2025-01-13 ENCOUNTER — OFFICE VISIT (OUTPATIENT)
Dept: CARDIOLOGY | Facility: CLINIC | Age: 65
End: 2025-01-13
Payer: MEDICARE

## 2025-01-13 VITALS
DIASTOLIC BLOOD PRESSURE: 82 MMHG | WEIGHT: 185 LBS | HEART RATE: 78 BPM | HEIGHT: 68 IN | SYSTOLIC BLOOD PRESSURE: 128 MMHG | OXYGEN SATURATION: 97 % | BODY MASS INDEX: 28.04 KG/M2

## 2025-01-13 DIAGNOSIS — C18.9 MALIGNANT NEOPLASM OF COLON, UNSPECIFIED PART OF COLON (CMS/HCC): ICD-10-CM

## 2025-01-13 DIAGNOSIS — Z98.61 POST PTCA: ICD-10-CM

## 2025-01-13 DIAGNOSIS — C43.31 MALIGNANT MELANOMA OF NOSE (CMS/HCC): ICD-10-CM

## 2025-01-13 DIAGNOSIS — E78.2 MIXED HYPERLIPIDEMIA: ICD-10-CM

## 2025-01-13 DIAGNOSIS — I49.3 PREMATURE VENTRICULAR CONTRACTION: ICD-10-CM

## 2025-01-13 DIAGNOSIS — I25.10 CORONARY ARTERY DISEASE INVOLVING NATIVE CORONARY ARTERY OF NATIVE HEART WITHOUT ANGINA PECTORIS: Primary | ICD-10-CM

## 2025-01-13 LAB
ATRIAL RATE: 74
P AXIS: 12
PR INTERVAL: 174
QRS DURATION: 90
QT INTERVAL: 358
QTC CALCULATION(BAZETT): 397
R AXIS: -2
T WAVE AXIS: -2
VENTRICULAR RATE: 74

## 2025-01-13 PROCEDURE — 99214 OFFICE O/P EST MOD 30 MIN: CPT | Performed by: STUDENT IN AN ORGANIZED HEALTH CARE EDUCATION/TRAINING PROGRAM

## 2025-01-13 PROCEDURE — G2211 COMPLEX E/M VISIT ADD ON: HCPCS | Performed by: STUDENT IN AN ORGANIZED HEALTH CARE EDUCATION/TRAINING PROGRAM

## 2025-01-13 PROCEDURE — 93000 ELECTROCARDIOGRAM COMPLETE: CPT | Performed by: STUDENT IN AN ORGANIZED HEALTH CARE EDUCATION/TRAINING PROGRAM

## 2025-01-13 ASSESSMENT — ENCOUNTER SYMPTOMS
HALLUCINATIONS: 0
DIAPHORESIS: 0
HEMATURIA: 0
JOINT SWELLING: 0
LIGHT-HEADEDNESS: 0
PALPITATIONS: 0
STRIDOR: 0
COLOR CHANGE: 0
AGITATION: 0
SHORTNESS OF BREATH: 0
EYE REDNESS: 0
FACIAL SWELLING: 0
MYALGIAS: 0
EYE DISCHARGE: 0
ABDOMINAL PAIN: 0
VOMITING: 0
FATIGUE: 0
DYSURIA: 0

## 2025-01-13 NOTE — PROGRESS NOTES
Eduar Myles MD, [facility]  Non-invasive Cardiology    [facility]  [facility] [place]  [address] [place] [address]     [facility]  [address]  [address], [address]  [place] [address]    [phone]    [facility]       2025        Patient Name: Dane Beckwith  Account:          [id number]  :               1960        Dear Alexander Duron MD:     I had the pleasure of seeing your patient, Dane Beckwith, on 2025. As you know, he is a 65 y.o. male with medical history as described below.     HPI  65 y.o. male with a history of CAD s/p PCI (mLAD - 3/2022), PVCs, HLD, hx of melanoma, and hx of colon cancer who presents for cardiovascular evaluation.  At today's visit the patient reports feeling well.  He denies any chest pains, shortness of breath, or other anginal equivalents.  He also denies any palpitations or fast irregular heartbeats nor any episodes of presyncope/syncope.  He remains compliant with all of his medications and has no major complaints for today.       The 10-year ASCVD risk score (Julio DK, et al., 2019) is: 11.8%    Values used to calculate the score:      Age: 65 years      Sex: Male      Is Non- : No      Diabetic: No      Tobacco smoker: No      Systolic Blood Pressure: 128 mmHg      Is BP treated: No      HDL Cholesterol: 49 mg/dL      Total Cholesterol: 136 mg/dL     CARDIOVASCULAR STUDIES:     Lab Results   Component Value Date    CHOL 136 2024    CHOL 135 2023    TRIG 121 2024    TRIG 102 2023    HDL 49 2024    HDL 52 2023    LDLCALC 65 2024    LDLCALC 64 2023       ECG: Independently reviewed: Sinus rhythm with frequent PVCs     ECHOCARDIOGRAM:  Results for orders placed during the hospital encounter of 25    Transthoracic echo (TTE) complete    Interpretation Summary    Left Ventricle: Normal ventricle size. Mild  concentric left ventricular hypertrophy. Estimated EF 55-60%. Wall motion appears grossly normal. Indeterminate diastolic filling pattern.    Right Ventricle: Normal ventricle size. Normal systolic function.    Left Atrium: Normal sized atrium.    Right Atrium: Normal sized atrium.    Aortic Valve: Tricuspid valve.  Sclerotic leaflets. No regurgitation. No stenosis.    Mitral Valve: Grossly normal leaflet structure. No regurgitation. No stenosis.    Tricuspid Valve: Structure is grossly normal. Mild regurgitation. Estimated RVSP = 33 mmHg.    Pulmonic Valve: Grossly normal structure. Trace regurgitation.    Aorta: Aortic root top normal. Sinuses of Valsalva normal-sized. Ascending aorta normal-sized.    IVC/SVC: Inferior vena cava not well visualized. Inferior vena cava is <2.1cm. Inferior vena cava demonstrates normal respiratory collapse.    Pericardium: Evidence of a prominent fat pad. No evidence of pericardial effusion.    No significant change compared to prior study from 3/29/2023.      CORONARY CALCIUM SCAN:  Results for orders placed during the hospital encounter of 12/15/21    CT HEART CORONARY ARTERY CALCIUM SCORE WITHOUT IV CONTRAST    Narrative  CLINICAL HISTORY: Hypercholesterolemia. Family history of coronary artery  disease.    COMMENT:    CT DOSE:  One or more dose reduction techniques (e.g. automated exposure  control, adjustment of the mA and/or kV according to patient size, use of  iterative reconstruction technique) utilized for this examination.    1) PROCEDURE: Cardiac gated axial CT noncontrast imaging of the heart was  performed. Images were processed with dedicated [employer] software on a dedicated  PACS Workstation, reviewed by the Radiologist and the coronary artery calcium  score was tabulated.    CT DOSE:  One or more dose reduction techniques (e.g. automated exposure  control, adjustment of the mA and/or kV according to patient size, use of  iterative reconstruction technique) utilized  "for this examination.    2) CARDIAC FINDINGS:    CORONARY CALCIUM COMPOSITE AGATSTON SCORE: 1678    Left Main: 52    LAD: 976    LCX: 216    RCA: 434      3) NON-CARDIAC FINDINGS    AORTA: There is mild calcification    PULMONARY VEINS: Unremarkable.    PULMONARY ARTERIES: Unremarkable.    SVC: Unremarkable.    IVC: Unremarkable.    LUNG ELLIS: Limited Evaluation. There is minimal scarring within the right  middle lobe.    LYMPH NODES: Unremarkable.    OSSEOUS STRUCTURES: There is levocurvature with mild multilevel degenerative  changes of the thoracic spine.    OTHER: There is a 3.5 cm right hepatic hypodensity, which was shown to represent  a hemangioma on previous study.        --    Impression  1. Composite Agatston coronary artery calcium score of 1678, which is between  the 95 and 100 percentile for white males between the ages of 55 and 64, as per  VALENZUELA 2006 Database. This correlates with \"extensive atherosclerotic plaque\" with  \"high likelihood of at least one significant coronary narrowing\".      STRESS TESTS:   No results found for this or any previous visit.    Results for orders placed during the hospital encounter of 12/30/21    Echocardiogram stress test    Interpretation Summary  Somewhat equivocal stress echocardiogram due to frequent PVCs  No obvious ischemia with stress echocardiogram  The patient completed 12 METS of exercise completed stage IV excellent exercise tolerance  Baseline EKG frequent PVCs with stress exercise and recovery no complex arrhythmia  Baseline echocardiogram normal chamber sizes no regional wall motion abnormalities preserved ejection fraction 55%  No structural valvular disease  Trace mitral regurgitation  No obvious regional wall motion abnormalities all walls appear to be hyperdynamic with exercise      CORONARY CTA:  Results for orders placed during the hospital encounter of 02/16/22    CTA CORONARY ARTERY WITH IV CONTRAST    Narrative  CLINICAL HISTORY: Coronary " disease.    COMMENT:  1).  PROCEDURE:  The patient was brought to the CT prep room in stable  condition.  The patient was n.p.o. for 4 hours.  An 20 gauge intracath IV was  started in the left antecubital space.  The patient's heart rate and blood  pressure were recorded.  Per protocol, Ivabradine  15 mgs was given orally  followed by Metoprolol 150  mgs given orally and additional Metoprolol 0 mgs  intravenously.   Sublingual Nitroglycerin was administered before contrast  injection.  Multi-detector CT of the chest/heart was then performed following  100 cc of Omnipaque 350 iodine contrast administered intravenously.  A  retrospective ECG gated and 100  kVp technique was utilized to limit radiation  exposure to the patient. Post processing for 3D evaluation was completed and  sent to [employer] 3D workstation for interpretation.  HeartFlow FFRct  Analysis was performed. . The overall quality of the scan was fair. The patient  tolerated the procedure well and was discharged in stable condition.    2)  CARDIAC FINDINGS:    CORONARY CALCIUM COMPOSITE AGATSTON SCORE:  1787  LM: 0                    LAD: 1068                    LCX: 253  RCA: 466  This places the patient in the VALENZUELA 97th risk percentile for age and gender  matched individuals.    CORONARY ARTERIES:    LEFT MAIN: Patent.    LAD:  Proximal: Diffuse disease, 10-30% proximal stenosis.  Mid: 50% mid stenosis, FFR CT 0.81.  Distal: Diffuse disease with FFR CT of 0.73.  DIAG 1: Large vessel, proximal to mid 50-70% stenosis.  FFR CT 0.72.    Ramus: Small vessel, mild disease    LCX:  Proximal: 10-30%.  Mid: 10-30%.  Distal: Not well seen.  OM1:  10-30%.    RCA:  Proximal: 10-30%.  Mid: 10-30%.  Distal: 10-30%.  RPDA: 10-30%.  RPLB: 10-30%.    DOMINANCE: Right.  **Lesion severity (stenosis):  Normal (patent), Minimal <30%, Mild 30-50%,  Moderate 50-70%, Severe 70-99%, Occluded >99%.    HEARTFLOW FFRct Analysis: Abnormal FFR CT for mid to distal left  anterior  descending, first diagonal branch    CARDIAC MORPHOLOGY:  LEFT VENTRICLE: Normal sized LV, mild left ventricular hypertrophy  RIGHT VENTRICLE: Grossly normal  LEFT ATRIUM: Grossly normal  RIGHT ATRIUM: Grossly normal  AORTIC VALVE: Trileaflet aortic valve  MITRAL VALVE: Grossly normal  TRICUSPID VALVE: Grossly normal  PERICARDIUM: Grossly normal    VENTRICULAR FUNCTION AND WALL MOTION:  LV EJECTION FRACTION: 48%  LV WALL MOTION: Grossly normal    SUMMARY:  1. Coronary Arteries:  Severe single vessel disease involving the left anterior  descending and diagonal with abnormal FFR CT.  2. Cardiac Structure:  Grossly normal cardiac structures.  3. Left Ventricular size and function:  Normal LV size, ejection fraction 48%.  4. Coronary calcium score 1787.  This places the patient in the VALENZUELA 97th risk  percentile for age and gender matched individuals.  5. Overall quality of the scan was fair.    Cardiac interpretation by  Mariela Ernandez M.D. on 2/16/2022.  Findings relayed to Dr. Caicedo.  ___________________________________________________________________________  This cardiac CT evaluation is a focused examination and does not include the  entire extent of structures listed below:  CT DOSE:  One or more dose reduction techniques (e.g. automated exposure  control, adjustment of the mA and/or kV according to patient size, use of  iterative reconstruction technique) utilized for this examination.    3D MIP and/or volume rendered image reconstruction performed and reviewed.    3) NON-CARDIAC FINDINGS    AORTA: The visualized portions of the thoracic aorta are normal in caliber. The  ascending thoracic aorta measures 35 mm and the descending measures 26 mm.  PULMONARY VEINS: Within normal limits  PULMONARY ARTERIES: Mild prominence of the bilateral main pulmonary arteries,  suggestive of pulmonary arterial hypertension.  SVC: Within normal limits  IVC: Within normal limits  LUNG FIELDS: Within normal  limits  LYMPH NODES: No lymphadenopathy.  OSSEOUS STRUCTURES: Degenerative changes in the thoracic spine.    --    Impression  1. See above cardiologist summary.  2. Noncardiac portions of the examination as described above    CT chest interpretation by Bernadette London MD on 2/16/2022.      CAROTID ULTRASOUND:  Results for orders placed during the hospital encounter of 01/03/25    Ultrasound carotid bilateral    Interpretation Summary    The right proximal internal carotid artery demonstrates <50% stenosis.    The left proximal internal carotid artery demonstrates <50% stenosis.    Minimal atherosclerotic plaque bilaterally.    Antegrade vertebral flow bilaterally.      ABDOMINAL AORTIC ULTRASOUND:  Results for orders placed during the hospital encounter of 01/03/25    Ultrasound abdominal aorta    Interpretation Summary    No evidence of abdominal aorta aneurysm    Mild diffuse aorto iliac atherosclerosis.    No significant iliac stenosis seen.        CARDIAC CATH:  Results for orders placed during the hospital encounter of 03/04/22    Cardiac catheterization    Conclusion  FINDINGS:  1. 80% mid-LAD stenosis.  2. 80% mid-ramus stenosis.  3. Nonobstructive disease throughout the remainder of the epicardial coronaries.    INTERVENTION:  Successful PCI of  LAD with a  3.18w65na Synergy BISI was deployed to 12 makenna. It was postdilated with a 3.0x12mm noncompliant balloon to 16 makenna. Repeat angiography demonstrated no residual stenosis and DARRIAN 3 flow.    RECOMMENDATIONS:  1.  dual antiplatelet therapy with aspirin and plavix  2.  Optimal medical therapy of CAD.  3. Aggressive risk factor modification.      PERIPHERAL:  No results found for this or any previous visit.      HOLTER  Results for orders placed during the hospital encounter of 11/17/21    [facility] Dormify Holter monitor - 24 hour    Narrative  1. The patient was monitored for 24 hours.  2. The predominant rhythm was sinus rhythm.  3. The average heart rate was 82  bpm.  4. The minimum heart rate was 63 bpm.  5. The maximum heart rate was 127 bpm.  6. There was 1 SVE tachycardia which lasted 4 beats 1.1 secs with an average heart rate of 159 bpm at 2:29 pm on 11/17/2021.  7. There were 75809 premature ventricular beats. There were 934 ventricular bigeminy cycles. There were 10 ventricular couplets.  8. Patients' diary was not submitted.  11/19/2021  Confirmed by Becka Rizvi ([id number]) on 11/19/2021 5:45:04 PM       Past Medical History:   Diagnosis Date    Colon cancer (CMS/HCC)     + radiation, chemo and surgery     Coronary artery disease     Malignant melanoma of nose (CMS/HCC) 10/06/2021    Melanoma of back (CMS/HCC) 10/06/2021    Premature ventricular contraction 10/06/2021        Past Surgical History   Procedure Laterality Date    Cardiac surgery      Colectomy      LEFT HEART CATH WITH CORONARY ANGIOGRAPHY N/A 3/4/2022    Performed by Manda Philip MD at [facility] CARDIAC CATH/EP    Skin biopsy      nose for melanoma    Stent BISI coronary - initial vessel N/A 3/4/2022    Performed by Manda Philip MD at [facility] CARDIAC CATH/EP        Family History   Problem Relation Name Age of Onset    Breast cancer Biological Mother      Colon cancer Biological Father      Heart disease Biological Father      Heart disease Father's Brother      Hyperlipidemia Biological Brother      Hypertension Biological Brother      Hyperlipidemia Biological Brother      Hypertension Biological Brother          Social History     Socioeconomic History    Marital status:      Spouse name: None    Number of children: None    Years of education: None    Highest education level: None   Tobacco Use    Smoking status: Never    Smokeless tobacco: Never   Vaping Use    Vaping status: Never Used   Substance and Sexual Activity    Alcohol use: Yes     Alcohol/week: 8.0 standard drinks of alcohol     Types: 8 Cans of beer per week     Comment: 6-8/wk    Drug use: No    Sexual activity: Yes     Partners: Female    Social History Narrative    Do you wear your seatbelt? Yes    Do you have smoke detector in your home? Yes    Do you have a carbon monoxide detector in your home? 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      Social Drivers of Health     Food Insecurity: No Food Insecurity (11/3/2024)    Hunger Vital Sign     Worried About Running Out of Food in the Last Year: Never true     Ran Out of Food in the Last Year: Never true   Transportation Needs: No Transportation Needs (11/3/2024)    PRAPARE - Transportation     Lack of Transportation (Medical): No     Lack of Transportation (Non-Medical): No   Housing Stability: Low Risk  (11/3/2024)    Housing Stability Vital Sign     Unable to Pay for Housing in the Last Year: No     Number of Times Moved in the Last Year: 0     Homeless in the Last Year: No        Current Outpatient Medications   Medication Sig Dispense Refill    ascorbic acid (VITAMIN C ORAL) Take by mouth.      aspirin 81 mg chewable tablet Take 81 mg by mouth daily.      cholecalciferol, vitamin D3, (VITAMIN D3 ORAL) Take 1 capsule by mouth once.      multivit-min/ferrous fumarate (MULTI VITAMIN ORAL) Take 1 tablet by mouth daily.      rosuvastatin (CRESTOR) 40 mg tablet Take 1 tablet (40 mg total) by mouth daily. 90 tablet 3     No current facility-administered medications for this visit.        Allergies:  Amoxicillin trihydrate, Penicillins, and Potassium clavulanate       Review of Systems   Constitutional:  Negative for diaphoresis and fatigue.   HENT:  Negative for facial swelling and nosebleeds.    Eyes:  Negative for discharge and redness.   Respiratory:  Negative for shortness of breath and stridor.    Cardiovascular:  Negative for chest pain and palpitations.   Gastrointestinal:  Negative for abdominal pain and vomiting.   Genitourinary:  Negative for dysuria and hematuria.   Musculoskeletal:  Negative for joint swelling and myalgias.   Skin:  Negative for color  "change and rash.   Neurological:  Negative for syncope and light-headedness.   Psychiatric/Behavioral:  Negative for agitation and hallucinations.         Vitals:    01/13/25 1050   BP: 128/82   BP Location: Left upper arm   Patient Position: Sitting   Pulse: 78   SpO2: 97%   Weight: 83.9 kg (185 lb)   Height: 1.727 m (5' 8\")     Body mass index is 28.13 kg/m².     Physical Exam  Constitutional:       General: He is not in acute distress.     Appearance: Normal appearance. He is not ill-appearing.   HENT:      Head: Normocephalic and atraumatic.      Nose: Nose normal.   Eyes:      General:         Right eye: No discharge.         Left eye: No discharge.      Conjunctiva/sclera: Conjunctivae normal.   Cardiovascular:      Rate and Rhythm: Normal rate and regular rhythm.      Heart sounds: No murmur heard.     No friction rub. No gallop.   Pulmonary:      Effort: No respiratory distress.      Breath sounds: No stridor. No wheezing, rhonchi or rales.   Abdominal:      General: There is no distension.      Tenderness: There is no guarding.   Musculoskeletal:      Right lower leg: No edema.      Left lower leg: No edema.   Skin:     General: Skin is warm and dry.   Neurological:      Mental Status: He is alert. Mental status is at baseline.   Psychiatric:         Mood and Affect: Mood normal.         Behavior: Behavior normal.             Diagnosis Plan   1. Coronary artery disease involving native coronary artery of native heart without angina pectoris        2. Premature ventricular contraction  [facility] MUSE ECG 12 lead (clinic performed)      3. Mixed hyperlipidemia  [facility] MUSE ECG 12 lead (clinic performed)      4. Post PTCA        5. Malignant neoplasm of colon, unspecified part of colon (CMS/HCC)        6. Malignant melanoma of nose (CMS/HCC)          I attest that this visit supports the complexity inherent to evaluation and management associated with medical care services that serve as the continuing focal " point for all needed health care services and/or medical care services that are part of ongoing care related to this patient's single, serious condition or a complex condition.      ASSESSMENT AND PLAN:     65 y.o. male with a history of CAD s/p PCI (mLAD - 3/2022), PVCs, HLD, hx of melanoma, and hx of colon cancer who presents for cardiovascular evaluation.      #CAD s/p PCI (mLAD - 3/2022)  Stable, free of anginal symptomatology.  Continue aspirin 81 mg daily and rosuvastatin 40 mg daily.     # PVCs  Currently asymptomatic.  May consider repeat heart monitor at next visit for periodic monitoring.     # HLD  Continue rosuvastatin as above.     # History of melanoma and colon cancer  Follow-up with PCP.          Return in about 6 months (around 7/13/2025).     I thank you for the opportunity to take part in the care of Dane Beckwith.  Please do not hesitate to contact me with any questions or concerns.     Sincerely,  Eduar Myles MD  Cardiovascular Disease  1/13/2025      This note was generated using speech recognition software. Please excuse any typographical errors. Please contact me with any questions or concerns.

## 2025-03-17 ENCOUNTER — APPOINTMENT (OUTPATIENT)
Age: 65
Setting detail: DERMATOLOGY
End: 2025-03-17

## 2025-03-17 DIAGNOSIS — D22 MELANOCYTIC NEVI: ICD-10-CM

## 2025-03-17 DIAGNOSIS — D49.2 NEOPLASM OF UNSPECIFIED BEHAVIOR OF BONE, SOFT TISSUE, AND SKIN: ICD-10-CM

## 2025-03-17 DIAGNOSIS — L81.4 OTHER MELANIN HYPERPIGMENTATION: ICD-10-CM

## 2025-03-17 DIAGNOSIS — D18.0 HEMANGIOMA: ICD-10-CM

## 2025-03-17 DIAGNOSIS — L82.1 OTHER SEBORRHEIC KERATOSIS: ICD-10-CM

## 2025-03-17 DIAGNOSIS — L57.8 OTHER SKIN CHANGES DUE TO CHRONIC EXPOSURE TO NONIONIZING RADIATION: ICD-10-CM

## 2025-03-17 PROBLEM — D18.01 HEMANGIOMA OF SKIN AND SUBCUTANEOUS TISSUE: Status: ACTIVE | Noted: 2025-03-17

## 2025-03-17 PROBLEM — D22.61 MELANOCYTIC NEVI OF RIGHT UPPER LIMB, INCLUDING SHOULDER: Status: ACTIVE | Noted: 2025-03-17

## 2025-03-17 PROCEDURE — OTHER SUNSCREEN RECOMMENDATIONS: OTHER

## 2025-03-17 PROCEDURE — OTHER MIPS QUALITY: OTHER

## 2025-03-17 PROCEDURE — 11102 TANGNTL BX SKIN SINGLE LES: CPT

## 2025-03-17 PROCEDURE — OTHER COUNSELING: OTHER

## 2025-03-17 PROCEDURE — OTHER BIOPSY BY SHAVE METHOD: OTHER

## 2025-03-17 PROCEDURE — 99213 OFFICE O/P EST LOW 20 MIN: CPT | Mod: 25

## 2025-03-17 ASSESSMENT — LOCATION DETAILED DESCRIPTION DERM
LOCATION DETAILED: EPIGASTRIC SKIN
LOCATION DETAILED: RIGHT SUPERIOR UPPER BACK
LOCATION DETAILED: LEFT INFERIOR MEDIAL MIDBACK
LOCATION DETAILED: RIGHT PROXIMAL POSTERIOR UPPER ARM
LOCATION DETAILED: RIGHT MEDIAL UPPER BACK
LOCATION DETAILED: RIGHT SUPERIOR MEDIAL UPPER BACK

## 2025-03-17 ASSESSMENT — LOCATION SIMPLE DESCRIPTION DERM
LOCATION SIMPLE: RIGHT UPPER ARM
LOCATION SIMPLE: LEFT LOWER BACK
LOCATION SIMPLE: ABDOMEN
LOCATION SIMPLE: RIGHT UPPER BACK

## 2025-03-17 ASSESSMENT — LOCATION ZONE DERM
LOCATION ZONE: TRUNK
LOCATION ZONE: ARM

## 2025-03-17 NOTE — PROCEDURE: BIOPSY BY SHAVE METHOD
Detail Level: Detailed
Depth Of Biopsy: dermis
Was A Bandage Applied: Yes
Size Of Lesion In Cm: 0
Biopsy Type: H and E
Biopsy Method: Personna blade
Anesthesia Type: 1% lidocaine without epinephrine
Anesthesia Volume In Cc: 0.5
Hemostasis: Drysol
Wound Care: Petrolatum
Dressing: bandage
Destruction After The Procedure: No
Type Of Destruction Used: Curettage
Curettage Text: The wound bed was treated with curettage after the biopsy was performed.
Cryotherapy Text: The wound bed was treated with cryotherapy after the biopsy was performed.
Electrodesiccation Text: The wound bed was treated with electrodesiccation after the biopsy was performed.
Electrodesiccation And Curettage Text: The wound bed was treated with electrodesiccation and curettage after the biopsy was performed.
Silver Nitrate Text: The wound bed was treated with silver nitrate after the biopsy was performed.
Lab: -[id number]
Medical Necessity Information: It is in your best interest to select a reason for this procedure from the list below. All of these items fulfill various CMS LCD requirements except the new and changing color options.
Consent: Written consent was obtained and risks were reviewed including but not limited to scarring, infection, bleeding, scabbing, incomplete removal, nerve damage and allergy to anesthesia.
Post-Care Instructions: I reviewed with the patient in detail post-care instructions. Patient is to keep the biopsy site dry overnight. Cleans daily with soap and water. Apply Aquaphor once or twice daily and a bandage for 3-5 days.
Notification Instructions: Patient will be notified of biopsy results. However, patient instructed to call the office if not contacted within 2 weeks.
Billing Type: Third-Party Bill
Information: Selecting Yes will display possible errors in your note based on the variables you have selected. This validation is only offered as a suggestion for you. PLEASE NOTE THAT THE VALIDATION TEXT WILL BE REMOVED WHEN YOU FINALIZE YOUR NOTE. IF YOU WANT TO FAX A PRELIMINARY NOTE YOU WILL NEED TO TOGGLE THIS TO 'NO' IF YOU DO NOT WANT IT IN YOUR FAXED NOTE.

## 2025-03-17 NOTE — HPI: FULL BODY SKIN EXAMINATION
What Is The Reason For Today's Visit?: Full Body Skin Examination
What Is The Reason For Today's Visit? (Being Monitored For X): concerning skin lesions on a periodic basis
Additional History: Patient presents for skin exam and has a history of a mm, BCC and SCC.

## 2025-04-04 ENCOUNTER — APPOINTMENT (OUTPATIENT)
Age: 65
Setting detail: DERMATOLOGY
End: 2025-04-04

## 2025-04-04 DIAGNOSIS — D22 MELANOCYTIC NEVI: ICD-10-CM

## 2025-04-04 PROBLEM — D22.5 MELANOCYTIC NEVI OF TRUNK: Status: ACTIVE | Noted: 2025-04-04

## 2025-04-04 PROCEDURE — OTHER SHAVE REMOVAL: OTHER

## 2025-04-04 PROCEDURE — 11301 SHAVE SKIN LESION 0.6-1.0 CM: CPT

## 2025-04-04 PROCEDURE — OTHER MIPS QUALITY: OTHER

## 2025-04-04 ASSESSMENT — LOCATION ZONE DERM: LOCATION ZONE: TRUNK

## 2025-04-04 ASSESSMENT — LOCATION SIMPLE DESCRIPTION DERM: LOCATION SIMPLE: RIGHT UPPER BACK

## 2025-04-04 ASSESSMENT — LOCATION DETAILED DESCRIPTION DERM: LOCATION DETAILED: RIGHT SUPERIOR UPPER BACK

## 2025-04-04 NOTE — PROCEDURE: SHAVE REMOVAL
Medical Necessity Information: It is in your best interest to select a reason for this procedure from the list below. All of these items fulfill various CMS LCD requirements except the new and changing color options.
Medical Necessity Clause: This procedure was medically necessary because the lesion that was treated was: a sebaceous carcinoma. Pathologist recommends complete removal.
Lab: -[id number]
Lab Facility: 0
Body Location Override (Optional - Billing Will Still Be Based On Selected Body Map Location If Applicable): right superior medial upper back
Detail Level: Detailed
Was A Bandage Applied: Yes
Size Of Lesion In Cm (Required): 0.7
X Size Of Lesion In Cm (Optional): 0.5
Size Of Margin In Cm (Margins Are Not Added To Billing Dimensions): 0.2
Depth Of Shave: dermis
Biopsy Method: Dermablade
Anesthesia Type: 1% lidocaine with epinephrine
Hemostasis: Drysol
Wound Care: Petrolatum
Render Path Notes In Note?: No
Consent was obtained from the patient. The risks and benefits to therapy were discussed in detail. Specifically, the risks of infection, scarring, bleeding, prolonged wound healing, incomplete removal, allergy to anesthesia, nerve injury and recurrence were addressed. Prior to the procedure, the treatment site was clearly identified and confirmed by the patient. All components of Universal Protocol/PAUSE Rule completed.
Post-Care Instructions: I reviewed with the patient in detail post-care instructions. Patient is to keep the biopsy site dry overnight, and then apply bacitracin twice daily until healed. Patient may apply hydrogen peroxide soaks to remove any crusting.
Notification Instructions: Patient will be notified of pathology results. However, patient instructed to call the office if not contacted within 2 weeks.
Billing Type: Third-Party Bill

## (undated) DEVICE — INFLATION DEVICE ENCORE 26

## (undated) DEVICE — GUIDEWIRE DIAGNOSTIC 035-260 EXCHANGE

## (undated) DEVICE — CATH GUIDE ADROIT 6FR .072 XB3 100CM

## (undated) DEVICE — GUIDEWIRE DIAGNOSTIC J .035. 150 (ORDER IN 5'S)

## (undated) DEVICE — CATH 6FR FL3.5

## (undated) DEVICE — ***USE 141890***CATH BALLOON NC TREK 3.0X12MM

## (undated) DEVICE — GLIDESHEATH SLENDER SS (.021) 6FR 10CM

## (undated) DEVICE — GUIDEWIRE BMW UNIVERSAL 190CM "J"

## (undated) DEVICE — TR BAND REGULAR

## (undated) DEVICE — CATH BALL EMERGE MONO 12/2.5MM

## (undated) DEVICE — CATH D 6F FR4 100CM